# Patient Record
Sex: FEMALE | Race: WHITE | NOT HISPANIC OR LATINO | Employment: OTHER | URBAN - METROPOLITAN AREA
[De-identification: names, ages, dates, MRNs, and addresses within clinical notes are randomized per-mention and may not be internally consistent; named-entity substitution may affect disease eponyms.]

---

## 2017-01-18 ENCOUNTER — OFFICE VISIT (OUTPATIENT)
Dept: CARDIOLOGY | Facility: CLINIC | Age: 82
End: 2017-01-18
Payer: MEDICARE

## 2017-01-18 VITALS
WEIGHT: 133.81 LBS | SYSTOLIC BLOOD PRESSURE: 168 MMHG | HEIGHT: 58 IN | DIASTOLIC BLOOD PRESSURE: 66 MMHG | BODY MASS INDEX: 28.09 KG/M2 | HEART RATE: 66 BPM

## 2017-01-18 DIAGNOSIS — I44.7 LEFT BUNDLE BRANCH BLOCK: ICD-10-CM

## 2017-01-18 DIAGNOSIS — I25.5 ISCHEMIC CARDIOMYOPATHY: Primary | ICD-10-CM

## 2017-01-18 DIAGNOSIS — N18.9 CHRONIC RENAL INSUFFICIENCY, UNSPECIFIED STAGE: ICD-10-CM

## 2017-01-18 DIAGNOSIS — I10 ESSENTIAL HYPERTENSION: ICD-10-CM

## 2017-01-18 PROCEDURE — 99499 UNLISTED E&M SERVICE: CPT | Mod: S$GLB,,, | Performed by: INTERNAL MEDICINE

## 2017-01-18 PROCEDURE — 99999 PR PBB SHADOW E&M-EST. PATIENT-LVL III: CPT | Mod: PBBFAC,,, | Performed by: INTERNAL MEDICINE

## 2017-01-18 PROCEDURE — 1126F AMNT PAIN NOTED NONE PRSNT: CPT | Mod: S$GLB,,, | Performed by: INTERNAL MEDICINE

## 2017-01-18 PROCEDURE — 1157F ADVNC CARE PLAN IN RCRD: CPT | Mod: S$GLB,,, | Performed by: INTERNAL MEDICINE

## 2017-01-18 PROCEDURE — 1160F RVW MEDS BY RX/DR IN RCRD: CPT | Mod: S$GLB,,, | Performed by: INTERNAL MEDICINE

## 2017-01-18 PROCEDURE — 99214 OFFICE O/P EST MOD 30 MIN: CPT | Mod: S$GLB,,, | Performed by: INTERNAL MEDICINE

## 2017-01-18 PROCEDURE — 1159F MED LIST DOCD IN RCRD: CPT | Mod: S$GLB,,, | Performed by: INTERNAL MEDICINE

## 2017-01-18 RX ORDER — DIPHENOXYLATE HYDROCHLORIDE AND ATROPINE SULFATE 2.5; .025 MG/1; MG/1
1 TABLET ORAL 4 TIMES DAILY PRN
COMMUNITY
End: 2017-04-25

## 2017-01-18 NOTE — PROGRESS NOTES
Subjective:    Patient ID:  Kristen Nicole is a 89 y.o. female who presents for follow-up of Hospital Follow Up (established pt- Dr. Bray ) and Hyperlipidemia      HPI 89 yo with hx of CAD and decreased LV function. Last cath 6-2016 with patent LAD stent with non-obstructive disease.Stent placed March of 2016. Since recent discharge doing well. Mild ALBRECHT. No edema or CP. Denies palpitations, weak spells, and syncope      Review of Systems   Constitution: Negative for decreased appetite, fever, weakness, malaise/fatigue, weight gain and weight loss.   HENT: Negative for headaches, hearing loss and nosebleeds.    Eyes: Negative for visual disturbance.   Cardiovascular: Positive for dyspnea on exertion. Negative for chest pain, claudication, cyanosis, irregular heartbeat, leg swelling, near-syncope, orthopnea, palpitations, paroxysmal nocturnal dyspnea and syncope.   Respiratory: Positive for shortness of breath. Negative for cough, hemoptysis, sleep disturbances due to breathing, snoring and wheezing.    Endocrine: Negative for cold intolerance, heat intolerance, polydipsia and polyuria.   Hematologic/Lymphatic: Negative for adenopathy and bleeding problem. Bruises/bleeds easily.   Skin: Negative for color change, itching, poor wound healing, rash and suspicious lesions.   Musculoskeletal: Positive for arthritis. Negative for back pain, falls, joint pain, joint swelling, muscle cramps, muscle weakness and myalgias.   Gastrointestinal: Negative for bloating, abdominal pain, change in bowel habit, constipation, flatus, heartburn, hematemesis, hematochezia, hemorrhoids, jaundice, melena, nausea and vomiting.   Genitourinary: Negative for bladder incontinence, decreased libido, frequency, hematuria, hesitancy and urgency.   Neurological: Negative for brief paralysis, difficulty with concentration, excessive daytime sleepiness, dizziness, focal weakness, light-headedness, loss of balance, numbness and vertigo.  "  Psychiatric/Behavioral: Negative for altered mental status, depression and memory loss. The patient does not have insomnia and is not nervous/anxious.    Allergic/Immunologic: Negative for environmental allergies, hives and persistent infections.        Objective:    Physical Exam   Constitutional: She is oriented to person, place, and time. She appears well-developed and well-nourished.   BP (!) 168/66  Pulse 66  Ht 4' 10" (1.473 m)  Wt 60.7 kg (133 lb 13.1 oz)  LMP  (LMP Unknown)  BMI 27.97 kg/m2     HENT:   Head: Normocephalic and atraumatic.   Right Ear: External ear normal.   Left Ear: External ear normal.   Nose: Nose normal.   Mouth/Throat: Oropharynx is clear and moist.   Eyes: Conjunctivae, EOM and lids are normal. Pupils are equal, round, and reactive to light. Right eye exhibits no discharge. Left eye exhibits no discharge. Right conjunctiva has no hemorrhage. No scleral icterus.   Neck: Normal range of motion. Neck supple. No JVD present. No tracheal deviation present. No thyromegaly present.   Cardiovascular: Normal rate, regular rhythm, normal heart sounds and intact distal pulses.  Exam reveals no gallop and no friction rub.    No murmur heard.  Pulmonary/Chest: Effort normal and breath sounds normal. No respiratory distress. She has no wheezes. She has no rales. She exhibits no tenderness. Breasts are symmetrical.   Abdominal: Soft. Bowel sounds are normal. She exhibits no distension and no mass. There is no hepatosplenomegaly or hepatomegaly. There is no tenderness. There is no rebound and no guarding.   Musculoskeletal: Normal range of motion. She exhibits no edema or tenderness.   Lymphadenopathy:     She has no cervical adenopathy.   Neurological: She is alert and oriented to person, place, and time. She displays normal reflexes. No cranial nerve deficit. Coordination normal.   Skin: Skin is warm and dry. No rash noted. No erythema. No pallor.   Psychiatric: She has a normal mood and affect. " Her behavior is normal. Judgment and thought content normal.   Nursing note and vitals reviewed.        Assessment:       1. Ischemic cardiomyopathy    2. Essential hypertension    3. Left bundle branch block    4. Chronic renal insufficiency, unspecified stage         Plan:     Systolic BP elevated. States better at home and has home health nurse checking.   The current medical regimen is effective;  continue present plan and medications.   Recheck echo in 3 months  No orders of the defined types were placed in this encounter.  Recent lab reviewed and OK  Return in about 3 months (around 4/18/2017).

## 2017-02-06 RX ORDER — FUROSEMIDE 20 MG/1
20 TABLET ORAL DAILY
Qty: 90 TABLET | Refills: 3 | Status: ON HOLD | OUTPATIENT
Start: 2017-02-06 | End: 2017-09-29

## 2017-02-06 RX ORDER — ISOSORBIDE MONONITRATE 60 MG/1
60 TABLET, EXTENDED RELEASE ORAL NIGHTLY
Qty: 90 TABLET | Refills: 3 | Status: SHIPPED | OUTPATIENT
Start: 2017-02-06 | End: 2018-03-10 | Stop reason: SDUPTHER

## 2017-03-03 ENCOUNTER — TELEPHONE (OUTPATIENT)
Dept: CARDIOLOGY | Facility: CLINIC | Age: 82
End: 2017-03-03

## 2017-03-03 NOTE — TELEPHONE ENCOUNTER
----- Message from Rosalia Olea RT sent at 3/3/2017  9:59 AM CST -----  Contact: Destiny (Daughter) [phone]  Destiny (Daughter) [phone], requesting a call back soon to have the pt worked in for a f/u appt with Dr. Gloria in the month of April 2017, please call back on Monday 03/06/2017, thanks.

## 2017-04-11 ENCOUNTER — CLINICAL SUPPORT (OUTPATIENT)
Dept: CARDIOLOGY | Facility: CLINIC | Age: 82
End: 2017-04-11
Payer: MEDICARE

## 2017-04-11 DIAGNOSIS — N18.4 CHRONIC KIDNEY DISEASE (CKD) STAGE G4/A1, SEVERELY DECREASED GLOMERULAR FILTRATION RATE (GFR) BETWEEN 15-29 ML/MIN/1.73 SQUARE METER AND ALBUMINURIA CREATININE RATIO LESS THAN 30 MG/G: ICD-10-CM

## 2017-04-11 DIAGNOSIS — R55 SYNCOPE, UNSPECIFIED SYNCOPE TYPE: ICD-10-CM

## 2017-04-11 DIAGNOSIS — E78.5 HYPERLIPIDEMIA WITH TARGET LDL LESS THAN 70: ICD-10-CM

## 2017-04-11 DIAGNOSIS — I70.209 TYPE 2 DIABETES WITH ATHEROSCLEROSIS OF ARTERIES OF EXTREMITIES: ICD-10-CM

## 2017-04-11 DIAGNOSIS — I44.7 LEFT BUNDLE BRANCH BLOCK: ICD-10-CM

## 2017-04-11 DIAGNOSIS — E11.51 TYPE 2 DIABETES WITH ATHEROSCLEROSIS OF ARTERIES OF EXTREMITIES: ICD-10-CM

## 2017-04-11 DIAGNOSIS — I51.89 DIASTOLIC DYSFUNCTION: ICD-10-CM

## 2017-04-11 DIAGNOSIS — I11.9 HYPERTENSIVE LEFT VENTRICULAR HYPERTROPHY, WITHOUT HEART FAILURE: ICD-10-CM

## 2017-04-11 DIAGNOSIS — I10 ESSENTIAL HYPERTENSION: ICD-10-CM

## 2017-04-11 DIAGNOSIS — I25.10 CORONARY ARTERY DISEASE INVOLVING NATIVE CORONARY ARTERY WITHOUT ANGINA PECTORIS, UNSPECIFIED WHETHER NATIVE OR TRANSPLANTED HEART: ICD-10-CM

## 2017-04-11 LAB
DIASTOLIC DYSFUNCTION: YES
ESTIMATED PA SYSTOLIC PRESSURE: 62.29
MITRAL VALVE MOBILITY: NORMAL
MITRAL VALVE REGURGITATION: ABNORMAL
RETIRED EF AND QEF - SEE NOTES: 55 (ref 55–65)
TRICUSPID VALVE REGURGITATION: ABNORMAL

## 2017-04-11 PROCEDURE — 93306 TTE W/DOPPLER COMPLETE: CPT | Mod: S$GLB,,, | Performed by: INTERNAL MEDICINE

## 2017-04-25 ENCOUNTER — OFFICE VISIT (OUTPATIENT)
Dept: CARDIOLOGY | Facility: CLINIC | Age: 82
End: 2017-04-25
Payer: MEDICARE

## 2017-04-25 VITALS
DIASTOLIC BLOOD PRESSURE: 86 MMHG | HEART RATE: 66 BPM | WEIGHT: 136.88 LBS | HEIGHT: 58 IN | SYSTOLIC BLOOD PRESSURE: 200 MMHG | BODY MASS INDEX: 28.73 KG/M2

## 2017-04-25 DIAGNOSIS — I51.9 SYSTOLIC DYSFUNCTION: ICD-10-CM

## 2017-04-25 DIAGNOSIS — I10 ESSENTIAL HYPERTENSION: ICD-10-CM

## 2017-04-25 DIAGNOSIS — I34.0 MITRAL VALVE INSUFFICIENCY, UNSPECIFIED ETIOLOGY: ICD-10-CM

## 2017-04-25 DIAGNOSIS — I25.10 ATHEROSCLEROSIS OF NATIVE CORONARY ARTERY WITHOUT ANGINA PECTORIS, UNSPECIFIED WHETHER NATIVE OR TRANSPLANTED HEART: Primary | ICD-10-CM

## 2017-04-25 DIAGNOSIS — I44.7 LEFT BUNDLE BRANCH BLOCK: ICD-10-CM

## 2017-04-25 DIAGNOSIS — E11.22 TYPE 2 DIABETES MELLITUS WITH STAGE 4 CHRONIC KIDNEY DISEASE, WITHOUT LONG-TERM CURRENT USE OF INSULIN: ICD-10-CM

## 2017-04-25 DIAGNOSIS — N18.4 TYPE 2 DIABETES MELLITUS WITH STAGE 4 CHRONIC KIDNEY DISEASE, WITHOUT LONG-TERM CURRENT USE OF INSULIN: ICD-10-CM

## 2017-04-25 DIAGNOSIS — I25.5 ISCHEMIC CARDIOMYOPATHY: ICD-10-CM

## 2017-04-25 PROCEDURE — 99499 UNLISTED E&M SERVICE: CPT | Mod: S$GLB,,, | Performed by: INTERNAL MEDICINE

## 2017-04-25 PROCEDURE — 1125F AMNT PAIN NOTED PAIN PRSNT: CPT | Mod: S$GLB,,, | Performed by: INTERNAL MEDICINE

## 2017-04-25 PROCEDURE — 99999 PR PBB SHADOW E&M-EST. PATIENT-LVL II: CPT | Mod: PBBFAC,,, | Performed by: INTERNAL MEDICINE

## 2017-04-25 PROCEDURE — 1159F MED LIST DOCD IN RCRD: CPT | Mod: S$GLB,,, | Performed by: INTERNAL MEDICINE

## 2017-04-25 PROCEDURE — 99214 OFFICE O/P EST MOD 30 MIN: CPT | Mod: S$GLB,,, | Performed by: INTERNAL MEDICINE

## 2017-04-25 PROCEDURE — 1157F ADVNC CARE PLAN IN RCRD: CPT | Mod: S$GLB,,, | Performed by: INTERNAL MEDICINE

## 2017-04-25 PROCEDURE — 1160F RVW MEDS BY RX/DR IN RCRD: CPT | Mod: S$GLB,,, | Performed by: INTERNAL MEDICINE

## 2017-04-25 NOTE — PROGRESS NOTES
Subjective:    Patient ID:  Kristen Nicole is a 89 y.o. female who presents for follow-up of Mitral Regurgitation and CAD    HPI  She comes with no complaints, no chest pain, no shortness of breath  FC II    Review of Systems   Constitution: Negative for decreased appetite, weakness, malaise/fatigue, weight gain and weight loss.   Cardiovascular: Negative for chest pain, dyspnea on exertion, leg swelling, palpitations and syncope.   Respiratory: Negative for cough and shortness of breath.    Gastrointestinal: Negative.    All other systems reviewed and are negative.       Objective:    Physical Exam   Constitutional: She is oriented to person, place, and time. She appears well-developed and well-nourished.   HENT:   Head: Normocephalic.   Eyes: Pupils are equal, round, and reactive to light.   Neck: Normal range of motion. Neck supple. No JVD present. Carotid bruit is not present. No thyromegaly present.   Cardiovascular: Normal rate, regular rhythm, intact distal pulses and normal pulses.  PMI is not displaced.  Exam reveals no gallop.    Murmur heard.  High-pitched blowing holosystolic murmur is present with a grade of 3/6  at the apex  Pulmonary/Chest: Effort normal and breath sounds normal.   Abdominal: Soft. Normal appearance. She exhibits no mass. There is no hepatosplenomegaly. There is no tenderness.   Musculoskeletal: Normal range of motion. She exhibits no edema.   Neurological: She is alert and oriented to person, place, and time. She has normal strength and normal reflexes. No sensory deficit.   Skin: Skin is warm and intact.   Psychiatric: She has a normal mood and affect.   Nursing note and vitals reviewed.    Echo reviewed      Assessment:       1. Atherosclerosis of native coronary artery without angina pectoris, unspecified whether native or transplanted heart    2. Ischemic cardiomyopathy    3. Essential hypertension    4. Left bundle branch block    5. Mitral valve insufficiency, unspecified  etiology    6. Type 2 diabetes mellitus with stage 4 chronic kidney disease, without long-term current use of insulin    7. Systolic dysfunction, LVEF 41%         Plan:     Continue all cardiac medications  Regular exercise program  6 m f/u

## 2017-05-01 ENCOUNTER — TELEPHONE (OUTPATIENT)
Dept: CARDIOLOGY | Facility: CLINIC | Age: 82
End: 2017-05-01

## 2017-05-09 ENCOUNTER — DOCUMENTATION ONLY (OUTPATIENT)
Dept: ADMINISTRATIVE | Facility: HOSPITAL | Age: 82
End: 2017-05-09

## 2017-05-09 NOTE — PROGRESS NOTES
PRE-VISIT CHART REVIEW    Appointment Scheduled on 5/10/17    Department stratifications & guidelines reviewed:yes    Target Chronic Diagnosis: DM, HTN, obesity    Chronic Diagnosis Intervention Due: yes    Goals Updated:No    Health Maintenance Due   Topic Date Due    Zoster Vaccine  12/04/1983    Pneumococcal (65+) (2 of 2 - PCV13) 01/06/2010    Urine Microalbumin  09/08/2016    Eye Exam  04/05/2017    Foot Exam  04/25/2017       Advanced Directives:   65 years of age or older?  Yes  Directive on file?  yes                                      Pre-visit patient communication:  In Person    Studies or screenings scheduled pre-visit: no    Educate patient on HTN (200/86), obesity (28.61)

## 2017-05-10 ENCOUNTER — OFFICE VISIT (OUTPATIENT)
Dept: FAMILY MEDICINE | Facility: CLINIC | Age: 82
End: 2017-05-10
Payer: MEDICARE

## 2017-05-10 VITALS
SYSTOLIC BLOOD PRESSURE: 138 MMHG | HEIGHT: 58 IN | RESPIRATION RATE: 18 BRPM | BODY MASS INDEX: 28.51 KG/M2 | WEIGHT: 135.81 LBS | TEMPERATURE: 98 F | HEART RATE: 88 BPM | DIASTOLIC BLOOD PRESSURE: 88 MMHG

## 2017-05-10 DIAGNOSIS — E11.69 HYPERLIPIDEMIA ASSOCIATED WITH TYPE 2 DIABETES MELLITUS: ICD-10-CM

## 2017-05-10 DIAGNOSIS — N18.30 CHRONIC KIDNEY DISEASE, STAGE III (MODERATE): ICD-10-CM

## 2017-05-10 DIAGNOSIS — E78.5 HYPERLIPIDEMIA ASSOCIATED WITH TYPE 2 DIABETES MELLITUS: ICD-10-CM

## 2017-05-10 DIAGNOSIS — M81.0 OSTEOPOROSIS, UNSPECIFIED OSTEOPOROSIS TYPE, UNSPECIFIED PATHOLOGICAL FRACTURE PRESENCE: ICD-10-CM

## 2017-05-10 DIAGNOSIS — E11.8 CONTROLLED TYPE 2 DIABETES MELLITUS WITH COMPLICATION, WITHOUT LONG-TERM CURRENT USE OF INSULIN: Primary | ICD-10-CM

## 2017-05-10 DIAGNOSIS — I25.110 CORONARY ARTERY DISEASE INVOLVING NATIVE CORONARY ARTERY OF NATIVE HEART WITH UNSTABLE ANGINA PECTORIS: ICD-10-CM

## 2017-05-10 DIAGNOSIS — E79.0 HYPERURICEMIA: ICD-10-CM

## 2017-05-10 DIAGNOSIS — E11.59 HYPERTENSION ASSOCIATED WITH DIABETES: ICD-10-CM

## 2017-05-10 DIAGNOSIS — R07.9 CHEST PAIN AT REST: ICD-10-CM

## 2017-05-10 DIAGNOSIS — I15.2 HYPERTENSION ASSOCIATED WITH DIABETES: ICD-10-CM

## 2017-05-10 LAB
25(OH)D3+25(OH)D2 SERPL-MCNC: 56 NG/ML
ALBUMIN SERPL BCP-MCNC: 4.5 G/DL
ALP SERPL-CCNC: 72 U/L
ALT SERPL W/O P-5'-P-CCNC: 16 U/L
ANION GAP SERPL CALC-SCNC: 12 MMOL/L
AST SERPL-CCNC: 23 U/L
BASOPHILS # BLD AUTO: 0.03 K/UL
BASOPHILS NFR BLD: 0.4 %
BILIRUB SERPL-MCNC: 0.4 MG/DL
BUN SERPL-MCNC: 31 MG/DL
CALCIUM SERPL-MCNC: 9.5 MG/DL
CHLORIDE SERPL-SCNC: 104 MMOL/L
CHOLEST/HDLC SERPL: 2.7 {RATIO}
CO2 SERPL-SCNC: 24 MMOL/L
CREAT SERPL-MCNC: 1.6 MG/DL
CREAT UR-MCNC: 45 MG/DL
CREAT UR-MCNC: 45 MG/DL
DIFFERENTIAL METHOD: NORMAL
EOSINOPHIL # BLD AUTO: 0.2 K/UL
EOSINOPHIL NFR BLD: 3.2 %
ERYTHROCYTE [DISTWIDTH] IN BLOOD BY AUTOMATED COUNT: 13.6 %
EST. GFR  (AFRICAN AMERICAN): 31.8 ML/MIN/1.73 M^2
EST. GFR  (NON AFRICAN AMERICAN): 27.6 ML/MIN/1.73 M^2
GLUCOSE SERPL-MCNC: 108 MG/DL
HCT VFR BLD AUTO: 38.3 %
HDL/CHOLESTEROL RATIO: 36.7 %
HDLC SERPL-MCNC: 188 MG/DL
HDLC SERPL-MCNC: 69 MG/DL
HGB BLD-MCNC: 12.3 G/DL
LDLC SERPL CALC-MCNC: 95.6 MG/DL
LYMPHOCYTES # BLD AUTO: 2.4 K/UL
LYMPHOCYTES NFR BLD: 34.9 %
MCH RBC QN AUTO: 30.4 PG
MCHC RBC AUTO-ENTMCNC: 32.1 %
MCV RBC AUTO: 95 FL
MICROALBUMIN UR DL<=1MG/L-MCNC: 135 UG/ML
MICROALBUMIN/CREATININE RATIO: 300 UG/MG
MONOCYTES # BLD AUTO: 0.5 K/UL
MONOCYTES NFR BLD: 7.7 %
NEUTROPHILS # BLD AUTO: 3.7 K/UL
NEUTROPHILS NFR BLD: 53.7 %
NONHDLC SERPL-MCNC: 119 MG/DL
PHOSPHATE SERPL-MCNC: 3.6 MG/DL
PLATELET # BLD AUTO: 250 K/UL
PMV BLD AUTO: 10.9 FL
POTASSIUM SERPL-SCNC: 4.4 MMOL/L
PROT SERPL-MCNC: 7.9 G/DL
PROT UR-MCNC: 26 MG/DL
PROT/CREAT RATIO, UR: 0.58
PTH-INTACT SERPL-MCNC: 262 PG/ML
RBC # BLD AUTO: 4.04 M/UL
SODIUM SERPL-SCNC: 140 MMOL/L
TRIGL SERPL-MCNC: 117 MG/DL
TSH SERPL DL<=0.005 MIU/L-ACNC: 1.56 UIU/ML
WBC # BLD AUTO: 6.85 K/UL

## 2017-05-10 PROCEDURE — 1157F ADVNC CARE PLAN IN RCRD: CPT | Mod: S$GLB,,, | Performed by: FAMILY MEDICINE

## 2017-05-10 PROCEDURE — 1159F MED LIST DOCD IN RCRD: CPT | Mod: S$GLB,,, | Performed by: FAMILY MEDICINE

## 2017-05-10 PROCEDURE — 80053 COMPREHEN METABOLIC PANEL: CPT

## 2017-05-10 PROCEDURE — 90670 PCV13 VACCINE IM: CPT | Mod: S$GLB,,, | Performed by: FAMILY MEDICINE

## 2017-05-10 PROCEDURE — 36415 COLL VENOUS BLD VENIPUNCTURE: CPT | Mod: S$GLB,,, | Performed by: FAMILY MEDICINE

## 2017-05-10 PROCEDURE — 82306 VITAMIN D 25 HYDROXY: CPT

## 2017-05-10 PROCEDURE — 84443 ASSAY THYROID STIM HORMONE: CPT

## 2017-05-10 PROCEDURE — 82570 ASSAY OF URINE CREATININE: CPT

## 2017-05-10 PROCEDURE — 99499 UNLISTED E&M SERVICE: CPT | Mod: S$GLB,,, | Performed by: FAMILY MEDICINE

## 2017-05-10 PROCEDURE — G0009 ADMIN PNEUMOCOCCAL VACCINE: HCPCS | Mod: 59,S$GLB,, | Performed by: FAMILY MEDICINE

## 2017-05-10 PROCEDURE — 84156 ASSAY OF PROTEIN URINE: CPT

## 2017-05-10 PROCEDURE — 1126F AMNT PAIN NOTED NONE PRSNT: CPT | Mod: S$GLB,,, | Performed by: FAMILY MEDICINE

## 2017-05-10 PROCEDURE — 85025 COMPLETE CBC W/AUTO DIFF WBC: CPT

## 2017-05-10 PROCEDURE — 84100 ASSAY OF PHOSPHORUS: CPT

## 2017-05-10 PROCEDURE — 83970 ASSAY OF PARATHORMONE: CPT

## 2017-05-10 PROCEDURE — 83036 HEMOGLOBIN GLYCOSYLATED A1C: CPT

## 2017-05-10 PROCEDURE — 99215 OFFICE O/P EST HI 40 MIN: CPT | Mod: S$GLB,,, | Performed by: FAMILY MEDICINE

## 2017-05-10 PROCEDURE — 80061 LIPID PANEL: CPT

## 2017-05-10 PROCEDURE — 1160F RVW MEDS BY RX/DR IN RCRD: CPT | Mod: S$GLB,,, | Performed by: FAMILY MEDICINE

## 2017-05-10 RX ORDER — NITROGLYCERIN 0.4 MG/1
0.4 TABLET SUBLINGUAL EVERY 5 MIN PRN
Qty: 25 TABLET | Refills: 3 | Status: SHIPPED | OUTPATIENT
Start: 2017-05-10 | End: 2018-01-01 | Stop reason: SDUPTHER

## 2017-05-10 RX ORDER — OMEGA-3-ACID ETHYL ESTERS 1 G/1
1 CAPSULE, LIQUID FILLED ORAL EVERY OTHER DAY
COMMUNITY
End: 2018-01-01

## 2017-05-10 RX ORDER — MULTIVITAMIN
1 TABLET ORAL DAILY
COMMUNITY
End: 2018-01-01

## 2017-05-10 NOTE — MR AVS SNAPSHOT
[facility]  [address] [address] [address]  [facility] [address]  Phone: [phone]  Fax: [phone]                  Kristen Nicole   5/10/2017 9:10 AM   Office Visit    Description:  Female : 1923   Provider:  Codi Jung MD   Department:  [facility]           Reason for Visit     Establish Care           Diagnoses this Visit        Comments    Controlled type 2 diabetes mellitus with complication, without long-term current use of insulin    -  Primary     Coronary artery disease involving native coronary artery of native heart with unstable angina pectoris         Chest pain at rest         Hyperuricemia         Hyperlipidemia associated with type 2 diabetes mellitus         Hypertension associated with diabetes         Osteoporosis, unspecified osteoporosis type, unspecified pathological fracture presence         Chronic kidney disease, stage III (moderate)                To Do List           Future Appointments        Provider Department Dept Phone    2017 9:15 AM SHAHZAD Watson OD [place] [facility] [phone]    2017 10:40 AM [facility] DEXA1 [facility] [phone]      Goals (5 Years of Data)     None       These Medications        Disp Refills Start End    nitroGLYCERIN (NITROSTAT) 0.4 MG SL tablet 25 tablet 3 5/10/2017     Place 1 tablet (0.4 mg total) under the tongue every 5 (five) minutes as needed for Chest pain. - Sublingual    Pharmacy: [facility] - [place] [phone] [facility] Ph #: [phone]         [facility]     [facility] Nurse Care Line -  Assistance  Unless otherwise directed by your provider, please contact [facility], our nurse care line that is available for  assistance.     Registered nurses in the [facility] provide: appointment scheduling, clinical advisement, health education, and other advisory services.  Call:  [phone] (toll free)               Medications           Message regarding Medications     Verify the changes and/or additions to your medication regime listed below are the same as discussed with your clinician today.  If any of these changes or additions are incorrect, please notify your healthcare provider.        STOP taking these medications     cholecalciferol, vitamin D3, (VITAMIN D3) 1,000 unit capsule Take 1,000 Units by mouth once daily.    IRON/VITAMIN B COMPLEX (GERITOL ORAL) Take 2 tablets by mouth.           Verify that the below list of medications is an accurate representation of the medications you are currently taking.  If none reported, the list may be blank. If incorrect, please contact your healthcare provider. Carry this list with you in case of emergency.           Current Medications     alprazolam (XANAX) 0.25 MG tablet Take 1 tablet (0.25 mg total) by mouth 2 (two) times daily as needed for Anxiety.    aspirin (ECOTRIN) 81 MG EC tablet Take 81 mg by mouth once daily.    blood sugar diagnostic Strp 1 strip by Misc.(Non-Drug; Combo Route) route once daily. Accu-check Plus Test Strips Drum    carbamazepine (TEGRETOL XR) 100 MG 12 hr tablet TAKE 1 TABLET TWICE DAILY    clopidogrel (PLAVIX) 75 mg tablet TAKE 1 TABLET ONE TIME DAILY    cranberry 400 mg Cap Take 500 mg by mouth once daily.     felodipine (PLENDIL) 5 MG 24 hr tablet Take 1 tablet (5 mg total) by mouth every evening.    ferrous sulfate 325 mg (65 mg iron) Tab tablet Take 1 tablet (325 mg total) by mouth once daily. Try on empty stomach first, can take with food if GI upset.    fluticasone (FLONASE) 50 mcg/actuation nasal spray 2 sprays by Each Nare route once daily.    furosemide (LASIX) 20 MG tablet Take 1 tablet (20 mg total) by mouth once daily.    GINGER ROOT, BULK, MISC 550 mg by Misc.(Non-Drug; Combo Route) route once daily.    hydrALAZINE (APRESOLINE) 25 MG tablet Take 1 tablet (25 mg total) by mouth every 12 (twelve)  "hours.    ipratropium (ATROVENT HFA) 17 mcg/actuation inhaler Inhale 2 puffs into the lungs every 6 (six) hours.    isosorbide mononitrate (IMDUR) 60 MG 24 hr tablet Take 1 tablet (60 mg total) by mouth every evening.    labetalol (NORMODYNE) 200 MG tablet Take 1 tablet (200 mg total) by mouth every 12 (twelve) hours.    multivitamin (ONE DAILY MULTIVITAMIN) per tablet Take 1 tablet by mouth once daily.    nitroGLYCERIN (NITROSTAT) 0.4 MG SL tablet Place 1 tablet (0.4 mg total) under the tongue every 5 (five) minutes as needed for Chest pain.    omega-3 acid ethyl esters (LOVAZA) 1 gram capsule Take 1 g by mouth 2 (two) times daily.    omeprazole (PRILOSEC) 20 MG capsule Take 1 capsule (20 mg total) by mouth once daily.    simvastatin (ZOCOR) 20 MG tablet Take 1 tablet (20 mg total) by mouth every evening.           Clinical Reference Information           Your Vitals Were     BP Pulse Temp Resp    138/88 (BP Location: Right arm, Patient Position: Sitting, BP Method: Manual) 88 97.9 °F (36.6 °C) (Oral) 18    Height Weight Last Period BMI    4' 10" (1.473 m) 61.6 kg (135 lb 12.9 oz) (LMP Unknown) 28.38 kg/m2      Blood Pressure          Most Recent Value    BP  138/88      Allergies as of 5/10/2017     Albuterol    Influenza A (H1n1) Vac 09 (Pf)    Nortriptyline    Pneumococcal Vaccine    Prevnar 13 [Pneumoc 13-abram Conj-dip Cr(pf)]      Immunizations Administered on Date of Encounter - 5/10/2017     Name Date Dose VIS Date Route    Pneumococcal Conjugate - 13 Valent 5/10/2017 0.5 mL 11/5/2015 Intramuscular      Orders Placed During Today's Visit      Normal Orders This Visit    Ambulatory referral to Optometry     CBC auto differential     Comprehensive metabolic panel     Hemoglobin A1c     Lipid panel     Microalbumin/creatinine urine ratio     Pneumococcal Conjugate Vaccine (13 Valent) (IM)     Protein / creatinine ratio, urine     PTH, intact     TSH     Vitamin D     Future Labs/Procedures Expected by Expires    " DXA Bone Density Spine And Hip  5/10/2017 5/10/2018    PHOSPHORUS  5/10/2017 7/9/2018      Language Assistance Services     ATTENTION: Language assistance services are available, free of charge. Please call [phone].      ATENCIÓN: Si habla latosha, tiene a haywood disposición servicios gratuitos de asistencia lingüística. Llame al [phone].     CHÚ Ý: N?u b?n nói Ti?ng Vi?t, có các d?ch v? h? tr? ngôn ng? mi?n phí dành cho b?n. G?i s? [phone].         [facility] complies with applicable Federal civil rights laws and does not discriminate on the basis of race, color, national origin, age, disability, or sex.

## 2017-05-11 LAB
ESTIMATED AVG GLUCOSE: 154 MG/DL
HBA1C MFR BLD HPLC: 7 %

## 2017-05-12 ENCOUNTER — TELEPHONE (OUTPATIENT)
Dept: FAMILY MEDICINE | Facility: CLINIC | Age: 82
End: 2017-05-12

## 2017-05-12 DIAGNOSIS — N18.4 CKD (CHRONIC KIDNEY DISEASE) STAGE 4, GFR 15-29 ML/MIN: Primary | ICD-10-CM

## 2017-05-12 NOTE — TELEPHONE ENCOUNTER
Spoke to patients daughter regarding results, verbalized understanding. Appts scheduled, daughter aware.

## 2017-05-12 NOTE — TELEPHONE ENCOUNTER
Pls notify pt:    Overall, labs look good except for slight incease in diabetes marker, decreased kidney function and protein in the urine    Let's merle you with the nephrologist and let's discuss all this at your next visit    (Merle fu with me in 4 weeks)

## 2017-05-15 ENCOUNTER — TELEPHONE (OUTPATIENT)
Dept: FAMILY MEDICINE | Facility: CLINIC | Age: 82
End: 2017-05-15

## 2017-05-15 ENCOUNTER — OFFICE VISIT (OUTPATIENT)
Dept: NEPHROLOGY | Facility: CLINIC | Age: 82
End: 2017-05-15
Payer: MEDICARE

## 2017-05-15 VITALS
OXYGEN SATURATION: 98 % | DIASTOLIC BLOOD PRESSURE: 62 MMHG | HEIGHT: 58 IN | WEIGHT: 137.38 LBS | SYSTOLIC BLOOD PRESSURE: 170 MMHG | TEMPERATURE: 98 F | BODY MASS INDEX: 28.84 KG/M2 | HEART RATE: 65 BPM

## 2017-05-15 DIAGNOSIS — N17.9 ACUTE RENAL FAILURE, UNSPECIFIED ACUTE RENAL FAILURE TYPE: ICD-10-CM

## 2017-05-15 DIAGNOSIS — N18.30 CHRONIC KIDNEY DISEASE, STAGE III (MODERATE): Primary | ICD-10-CM

## 2017-05-15 PROCEDURE — 1157F ADVNC CARE PLAN IN RCRD: CPT | Mod: S$GLB,,, | Performed by: INTERNAL MEDICINE

## 2017-05-15 PROCEDURE — 1159F MED LIST DOCD IN RCRD: CPT | Mod: S$GLB,,, | Performed by: INTERNAL MEDICINE

## 2017-05-15 PROCEDURE — 1126F AMNT PAIN NOTED NONE PRSNT: CPT | Mod: S$GLB,,, | Performed by: INTERNAL MEDICINE

## 2017-05-15 PROCEDURE — 99999 PR PBB SHADOW E&M-EST. PATIENT-LVL III: CPT | Mod: PBBFAC,,, | Performed by: INTERNAL MEDICINE

## 2017-05-15 PROCEDURE — 99204 OFFICE O/P NEW MOD 45 MIN: CPT | Mod: S$GLB,,, | Performed by: INTERNAL MEDICINE

## 2017-05-15 PROCEDURE — 1160F RVW MEDS BY RX/DR IN RCRD: CPT | Mod: S$GLB,,, | Performed by: INTERNAL MEDICINE

## 2017-05-15 PROCEDURE — 99499 UNLISTED E&M SERVICE: CPT | Mod: S$GLB,,, | Performed by: INTERNAL MEDICINE

## 2017-05-15 NOTE — TELEPHONE ENCOUNTER
----- Message from Harry Robles MD sent at 5/15/2017  9:18 AM CDT -----  Hello.  I will repeat her labs on the 8th.  I asked her to drink a bit more water since she seems to be bad about that.  Any efforts to simplify her medication regimen would be great.

## 2017-05-15 NOTE — Clinical Note
Hello.  I will repeat her labs on the 8th.  I asked her to drink a bit more water since she seems to be bad about that.  Any efforts to simplify her medication regimen would be great.

## 2017-05-15 NOTE — PROGRESS NOTES
Subjective:       Patient ID: Kristen Nicole is a 89 y.o. White female who presents for new patient evaluation for chronic renal failure.    Kristen Nicole is referred by Codi Jung MD to be evaluated for chronic renal failure.  She was admitted in January with influenza.  She has been told that she has had kidney problems in the past.  She reports that she does not drink much water at times and says that plain water nauseates her.  She occasionally takes ibuprofen for the arthritis in her hands and back.  She did suffer a fall at home this weekend injuring her left elbow.  She would like to take fewer medications.      Review of Systems   Constitutional: Negative for appetite change, chills and fever.   Eyes: Negative for visual disturbance.   Respiratory: Positive for shortness of breath (intermittent). Negative for cough.    Cardiovascular: Negative for chest pain and leg swelling.   Gastrointestinal: Negative for diarrhea, nausea and vomiting.   Genitourinary: Negative for difficulty urinating, dysuria and hematuria.   Musculoskeletal: Positive for arthralgias (hands) and back pain.   Skin: Negative for rash.   Neurological: Negative for headaches.   Psychiatric/Behavioral: The patient is nervous/anxious.          Past Medical History:   Diagnosis Date    Arthritis     Asthma     CAD (coronary artery disease)     s/p LAD stent;  last cath 6/16 with 30-40% blockages    Carotid arterial disease     noted on USG 12/15    CKD (chronic kidney disease) stage 3, GFR 30-59 ml/min     Diabetes mellitus, type 2     Diastolic dysfunction     grade 2 on 4/17 ECHO    GERD (gastroesophageal reflux disease)     H/O colonoscopy     5/08    Hiatal hernia     noted on EGD    History of gout     History of SCC (squamous cell carcinoma) of skin     Hyperlipidemia     Hypertension     Ischemic cardiomyopathy     EF 55% on 4/17 ECHO but was 25% on 12/16 ECHO    Mitral valve regurgitation      mild-moderate on 4/17 ECHO    Occipital neuralgia     Osteoporosis     noted on 11/14    Pulmonary hypertension     noted on 4/17 ECHO     Past Surgical History:   Procedure Laterality Date    ANTERIOR CERVICAL DISCECTOMY W/ FUSION      Cervical Fusion    APPENDECTOMY      CATARACT EXTRACTION Bilateral     ORIF TIBIA & FIBULA FRACTURES      screws and wires in ankle    RETINAL DETACHMENT SURGERY Right     TONSILLECTOMY, ADENOIDECTOMY       Social History     Social History    Marital status:      Spouse name: N/A    Number of children: 5    Years of education: N/A     Occupational History    nurse       Office     Social History Main Topics    Smoking status: Never Smoker    Smokeless tobacco: Never Used    Alcohol use No    Drug use: No    Sexual activity: Not Currently     Other Topics Concern    Not on file     Social History Narrative     Current Outpatient Prescriptions   Medication Sig    alprazolam (XANAX) 0.25 MG tablet Take 1 tablet (0.25 mg total) by mouth 2 (two) times daily as needed for Anxiety.    aspirin (ECOTRIN) 81 MG EC tablet Take 81 mg by mouth once daily.    blood sugar diagnostic Strp 1 strip by Misc.(Non-Drug; Combo Route) route once daily. Accu-check Plus Test Strips Drum    carbamazepine (TEGRETOL XR) 100 MG 12 hr tablet TAKE 1 TABLET TWICE DAILY    clopidogrel (PLAVIX) 75 mg tablet TAKE 1 TABLET ONE TIME DAILY    cranberry 400 mg Cap Take 500 mg by mouth once daily.     felodipine (PLENDIL) 5 MG 24 hr tablet Take 1 tablet (5 mg total) by mouth every evening.    ferrous sulfate 325 mg (65 mg iron) Tab tablet Take 1 tablet (325 mg total) by mouth once daily. Try on empty stomach first, can take with food if GI upset.    fluticasone (FLONASE) 50 mcg/actuation nasal spray 2 sprays by Each Nare route once daily. (Patient taking differently: 2 sprays by Each Nare route daily as needed. )    furosemide (LASIX) 20 MG tablet Take 1 tablet (20 mg total) by mouth  "once daily.    GINGER ROOT, BULK, MISC 550 mg by Misc.(Non-Drug; Combo Route) route once daily.    hydrALAZINE (APRESOLINE) 25 MG tablet Take 1 tablet (25 mg total) by mouth every 12 (twelve) hours.    ipratropium (ATROVENT HFA) 17 mcg/actuation inhaler Inhale 2 puffs into the lungs every 6 (six) hours.    isosorbide mononitrate (IMDUR) 60 MG 24 hr tablet Take 1 tablet (60 mg total) by mouth every evening.    labetalol (NORMODYNE) 200 MG tablet Take 1 tablet (200 mg total) by mouth every 12 (twelve) hours.    multivitamin (ONE DAILY MULTIVITAMIN) per tablet Take 1 tablet by mouth once daily.    nitroGLYCERIN (NITROSTAT) 0.4 MG SL tablet Place 1 tablet (0.4 mg total) under the tongue every 5 (five) minutes as needed for Chest pain.    omega-3 acid ethyl esters (LOVAZA) 1 gram capsule Take 1 g by mouth 2 (two) times daily.    omeprazole (PRILOSEC) 20 MG capsule Take 1 capsule (20 mg total) by mouth once daily.    simvastatin (ZOCOR) 20 MG tablet Take 1 tablet (20 mg total) by mouth every evening.     No current facility-administered medications for this visit.        BP (!) 170/62 (BP Location: Left arm, Patient Position: Sitting)  Pulse 65  Temp 97.7 °F (36.5 °C)  Ht 4' 10" (1.473 m)  Wt 62.3 kg (137 lb 5.6 oz)  LMP  (LMP Unknown)  SpO2 98%  BMI 28.71 kg/m2    Objective:      Physical Exam   Constitutional: She is oriented to person, place, and time. She appears well-developed and well-nourished. No distress.   HENT:   Head: Normocephalic and atraumatic.   Eyes: Conjunctivae are normal.   Neck: Neck supple. No JVD present.   Cardiovascular: Normal rate, regular rhythm and normal heart sounds.  Exam reveals no gallop and no friction rub.    No murmur heard.  Pulmonary/Chest: Effort normal and breath sounds normal. No respiratory distress. She has no wheezes. She has no rales.   Abdominal: Soft. Bowel sounds are normal. She exhibits no distension. There is no tenderness.   Musculoskeletal: She exhibits " no edema.   Decreased pulses in hands and feet   Neurological: She is alert and oriented to person, place, and time.   Skin: Skin is warm and dry. No rash noted.   Psychiatric: She has a normal mood and affect.   Vitals reviewed.      Assessment:       1. Chronic kidney disease, stage III (moderate)    2. Acute renal failure, unspecified acute renal failure type        Plan:   Return to clinic in 6 months.  Labs for next visit include rp , pth.  Renal panel on 6/8.  Baseline creatinine is 1.3 from 7947-8571.  Since that time her creatinine has ranged from 1.4-1.7.  She has a renal US which showed small kidneys bilaterally at 8.0 and 8.3 cm.  I reassured her and asked her to drink more fluids as dehydration can cause her creatinine to be labile like it has been the past two years.  I do not see any particularly nephrotoxic medications but I would like to see her regimen simplified if possible.  She has had a somewhat labile function over the past 3 years with spikes but returns to her baseline.

## 2017-05-15 NOTE — PROGRESS NOTES
"Subjective:       Patient ID: Kristen Nicole is a 89 y.o. female.    Chief Complaint: Establish Care    HPI   The patient is a 89-year-old who is here today to establish care with me.  She recently moved to this area to live with her daughter.  Overall, she is doing "great".  She "lives an active life".  She likes to "go off with my friends" to do activities.    Today we discussed the followin)  diabetes.  She has diabetes but does not check her sugars at home.  She is currently on no medication for her diabetes.  She is due for diabetic eye exam  2)  anemia.  She has had trouble with anemia.  She is taking iron every other day because iron every day causes constipation  3)  CAD.  She does have a history of CAD.  She does have one stent.  She does see a cardiologist regularly.  She does need a refill of her nitroglycerin which she rarely uses.  Her current supply of nitroglycerin is  and she would like to have a new prescription.  She is taking aspirin, Plavix and imdur  4)  chronic kidney disease.  We did discuss her recent GFRs.  She is not currently seeing a nephrologist but would be willing to do so  5)  hyperlipidemia.  She is currently taking Zocor and lovaza.  She is due for labs and would be willing to have that done today  6)  hypertension.  Her blood pressure is good today.  She does not check her blood pressure outside of the office.  She is currently taking Plendil, hydralazine, and labetalol  7)  history of occipital neuralgias.  She is currently taking Tegretol due to the pain she had been getting in her occipital area.  She has been told never to stop this medication and plans to continue with this long-term   8)  history of gout.  She has had gouty episodes in the past but none recently.  9)  osteoporosis.  Her last DEXA scan was in 2014.  It did show osteoporosis but she did not have any treatment    Review of Systems   Constitutional: Negative for appetite change, chills, " diaphoresis, fatigue, fever and unexpected weight change.   HENT: Negative for congestion, ear pain, postnasal drip, rhinorrhea, sinus pressure, sneezing, sore throat and trouble swallowing.    Eyes: Negative for pain, discharge and visual disturbance.   Respiratory: Negative for cough, chest tightness, shortness of breath and wheezing.    Cardiovascular: Negative for chest pain, palpitations and leg swelling.   Gastrointestinal: Negative for abdominal distention, abdominal pain, blood in stool, constipation, diarrhea, nausea and vomiting.   Skin: Negative for rash.       Objective:      Physical Exam   Constitutional: She is oriented to person, place, and time. She appears well-developed and well-nourished. No distress.   HENT:   Head: Normocephalic and atraumatic.   Right Ear: Hearing, tympanic membrane, external ear and ear canal normal.   Left Ear: Hearing, tympanic membrane, external ear and ear canal normal.   Nose: Nose normal.   Mouth/Throat: Oropharynx is clear and moist and mucous membranes are normal. No oral lesions. No oropharyngeal exudate, posterior oropharyngeal edema or posterior oropharyngeal erythema.   Eyes: Conjunctivae, EOM and lids are normal. Pupils are equal, round, and reactive to light. No scleral icterus.   Neck: Normal range of motion. Neck supple. Carotid bruit is not present. No thyroid mass and no thyromegaly present.   Cardiovascular: Normal rate and regular rhythm.   No extrasystoles are present. PMI is not displaced.  Exam reveals no gallop.    Murmur heard.   Systolic murmur is present with a grade of 2/6   Pulses:       Dorsalis pedis pulses are 2+ on the right side, and 2+ on the left side.        Posterior tibial pulses are 2+ on the right side, and 2+ on the left side.   Pulmonary/Chest: Effort normal and breath sounds normal. No accessory muscle usage. No respiratory distress.   Clear to auscultation bilaterally.   Abdominal: Soft. Normal appearance and bowel sounds are  "normal. She exhibits no abdominal bruit. There is no hepatosplenomegaly. There is no tenderness. There is no rebound.   Musculoskeletal:        Right foot: There is no deformity.        Left foot: There is no deformity.   Feet:   Right Foot:   Protective Sensation: 10 sites tested. 10 sites sensed.   Skin Integrity: Positive for warmth. Negative for ulcer or callus.   Left Foot:   Protective Sensation: 10 sites tested. 10 sites sensed.   Skin Integrity: Positive for warmth. Negative for ulcer or callus.   Lymphadenopathy:        Head (right side): No submental and no submandibular adenopathy present.        Head (left side): No submental and no submandibular adenopathy present.        Right cervical: No superficial cervical, no deep cervical and no posterior cervical adenopathy present.       Left cervical: No superficial cervical, no deep cervical and no posterior cervical adenopathy present.        Right: No supraclavicular adenopathy present.        Left: No supraclavicular adenopathy present.   Neurological: She is alert and oriented to person, place, and time.   Skin: Skin is warm, dry and intact.   Psychiatric: She has a normal mood and affect.     Blood pressure 138/88, pulse 88, temperature 97.9 °F (36.6 °C), temperature source Oral, resp. rate 18, height 4' 10" (1.473 m), weight 61.6 kg (135 lb 12.9 oz).Body mass index is 28.38 kg/(m^2).          A/P:  1)  diabetes.  New to me.  Status unknown.  We will check fasting labs today and a urine microalbumin.  We will schedule a diabetic eye exam.    2)  anemia.  New to me.  Status unknown.  We will check a CBC.  If she continues to have anemia, we will refer her to hematology  3)  CAD.  New to me.  Asymptomatic.  I did refill her nitroglycerin.  She will continue with her other cardiac medications and follow up with cardiology as planned   4)  chronic kidney disease.  New to me.  Status unknown.  We will check a urine protein creatinine ratio and labs.  If her " labs are abnormal, we will refer her to nephrology  5)  hyperlipidemia.  New to me.  Status unknown.  We will check fasting labs today  6)  hypertension.  New to me.  Well-controlled.  Continue current medication  7)  history of occipital neuralgias.  New to me.  Asymptomatic.  Continue with Tegretol  8)  history of gout.  New to me.  Asymptomatic.  We will check uric acid level today  9)  osteoporosis.  New to me.  Status unknown.  We will check a DEXA scan soon.  We will check a vitamin D today  10)  Health maintainece issues.  We will administer prevnar today    Total visit time was 55 minutes   Greater than 50% of this time was spent couseling the patient or coordinating their care for the above issues and reviewing her chart

## 2017-05-15 NOTE — LETTER
May 15, 2017      Codi Jung MD  [address] [address] [address]           [facility] [address] [facility] [address] [place] [address]  Phone: [phone]          Patient: Kristen Nicole   MR Number: [id number]   YOB: 1923   Date of Visit: 5/15/2017       Dear Dr. Codi Jung:    Thank you for referring Kristen Nicole to me for evaluation. Attached you will find relevant portions of my assessment and plan of care.    If you have questions, please do not hesitate to call me. I look forward to following Kristen sanches with you.    Sincerely,    Harry Robles MD    Enclosure  CC:  No Recipients    If you would like to receive this communication electronically, please contact [email or web address] or [phone] to request more information on [facility] Link access.    For providers and/or their staff who would like to refer a patient to [facility], please contact us through our one-stop-shop provider referral line, [facility], at [phone].    If you feel you have received this communication in error or would no longer like to receive these types of communications, please e-mail [email or web address]

## 2017-05-15 NOTE — MR AVS SNAPSHOT
[facility]  [address] [facility] [address]  [facility] [address]  Phone: [phone]                  Kristen Nicole   5/15/2017 8:40 AM   Office Visit    Description:  Female : 1923   Provider:  Harry Robles MD   Department:  [facility] Neph[facility]                To Do List           Future Appointments        Provider Department Dept Phone    5/15/2017 8:40 AM Harry Robles MD [facility] [phone]    2017 8:50 AM Codi Jung MD [facility] [phone]    2017 9:15 AM SHAHZAD Watson OD [facility] Opt[facility] [phone]    2017 10:40 AM [facility] DEXA1 [facility] [phone]      Goals (5 Years of Data)     None      [facility]     [facility] Nurse Care Line -  Assistance  Unless otherwise directed by your provider, please contact [facility], our nurse care line that is available for  assistance.     Registered nurses in the [facility] Center provide: appointment scheduling, clinical advisement, health education, and other advisory services.  Call: [phone] (toll free)               Medications           Message regarding Medications     Verify the changes and/or additions to your medication regime listed below are the same as discussed with your clinician today.  If any of these changes or additions are incorrect, please notify your healthcare provider.             Verify that the below list of medications is an accurate representation of the medications you are currently taking.  If none reported, the list may be blank. If incorrect, please contact your healthcare provider. Carry this list with you in case of emergency.           Current Medications     alprazolam (XANAX) 0.25 MG tablet Take 1 tablet (0.25 mg total) by mouth 2 (two) times daily as needed for Anxiety.    aspirin (ECOTRIN) 81 MG EC tablet Take 81 mg by mouth once daily.    blood sugar diagnostic Strp 1 strip by  "Misc.(Non-Drug; Combo Route) route once daily. Accu-check Plus Test Strips Drum    carbamazepine (TEGRETOL XR) 100 MG 12 hr tablet TAKE 1 TABLET TWICE DAILY    clopidogrel (PLAVIX) 75 mg tablet TAKE 1 TABLET ONE TIME DAILY    cranberry 400 mg Cap Take 500 mg by mouth once daily.     felodipine (PLENDIL) 5 MG 24 hr tablet Take 1 tablet (5 mg total) by mouth every evening.    ferrous sulfate 325 mg (65 mg iron) Tab tablet Take 1 tablet (325 mg total) by mouth once daily. Try on empty stomach first, can take with food if GI upset.    fluticasone (FLONASE) 50 mcg/actuation nasal spray 2 sprays by Each Nare route once daily.    furosemide (LASIX) 20 MG tablet Take 1 tablet (20 mg total) by mouth once daily.    GINGER ROOT, BULK, MISC 550 mg by Misc.(Non-Drug; Combo Route) route once daily.    hydrALAZINE (APRESOLINE) 25 MG tablet Take 1 tablet (25 mg total) by mouth every 12 (twelve) hours.    ipratropium (ATROVENT HFA) 17 mcg/actuation inhaler Inhale 2 puffs into the lungs every 6 (six) hours.    isosorbide mononitrate (IMDUR) 60 MG 24 hr tablet Take 1 tablet (60 mg total) by mouth every evening.    labetalol (NORMODYNE) 200 MG tablet Take 1 tablet (200 mg total) by mouth every 12 (twelve) hours.    multivitamin (ONE DAILY MULTIVITAMIN) per tablet Take 1 tablet by mouth once daily.    nitroGLYCERIN (NITROSTAT) 0.4 MG SL tablet Place 1 tablet (0.4 mg total) under the tongue every 5 (five) minutes as needed for Chest pain.    omega-3 acid ethyl esters (LOVAZA) 1 gram capsule Take 1 g by mouth 2 (two) times daily.    omeprazole (PRILOSEC) 20 MG capsule Take 1 capsule (20 mg total) by mouth once daily.    simvastatin (ZOCOR) 20 MG tablet Take 1 tablet (20 mg total) by mouth every evening.           Clinical Reference Information           Your Vitals Were     BP Pulse Temp Height Weight Last Period    170/62 (BP Location: Left arm, Patient Position: Sitting) 65 97.7 °F (36.5 °C) 4' 10" (1.473 m) 62.3 kg (137 lb 5.6 oz) (LMP " Unknown)    SpO2 BMI             98% 28.71 kg/m2         Blood Pressure          Most Recent Value    BP  (!)  170/62      Allergies as of 5/15/2017     Albuterol    Influenza A (H1n1) Vac 09 (Pf)    Nortriptyline    Pneumococcal Vaccine    Prevnar 13 [Pneumoc 13-abram Conj-dip Cr(pf)]      Immunizations Administered on Date of Encounter - 5/15/2017     None      Language Assistance Services     ATTENTION: Language assistance services are available, free of charge. Please call [phone].      ATENCIÓN: Si habla latosha, tiene a haywood disposición servicios gratuitos de asistencia lingüística. Llame al [phone].     [facility] Ý: N?u b?n nói Ti?ng Vi?t, có các d?ch v? h? tr? ngôn ng? mi?n phí dành cho b?n. G?i s? [phone].         [facility] complies with applicable Federal civil rights laws and does not discriminate on the basis of race, color, national origin, age, disability, or sex.

## 2017-05-16 DIAGNOSIS — J45.901 ASTHMA WITH ACUTE EXACERBATION, UNSPECIFIED ASTHMA SEVERITY: Primary | ICD-10-CM

## 2017-05-16 RX ORDER — NEBULIZER AND COMPRESSOR
EACH MISCELLANEOUS
Qty: 1 EACH | Refills: 0 | Status: SHIPPED | OUTPATIENT
Start: 2017-05-16 | End: 2017-05-18 | Stop reason: SDUPTHER

## 2017-05-16 NOTE — TELEPHONE ENCOUNTER
----- Message from Patricia Rivera sent at 5/16/2017 10:50 AM CDT -----  Contact: Daughter , Destiny  Please call Destiny, patient cannot leave the house without her Nebulizer. There is a portable nebulizer that [facility] will cover if you approve. Please send an order for portable, battery operated Nebulizer to [facility]. Any questions call Destiny at [phone].

## 2017-05-16 NOTE — TELEPHONE ENCOUNTER
Pt has nebulizer at home. Pt wants to be able to leave house and have a nebulizer. Pt daughter says [facility] will cover portable battery operated nebulizer. Pt daughter  says pt has asthma . --lp

## 2017-05-19 RX ORDER — NEBULIZER AND COMPRESSOR
EACH MISCELLANEOUS
Qty: 1 EACH | Refills: 0 | Status: SHIPPED | OUTPATIENT
Start: 2017-05-19 | End: 2018-01-01

## 2017-05-23 NOTE — TELEPHONE ENCOUNTER
Pt aware order ready to be picked up . She will need to bring this to medical supply store( such as [employer] ) .--lp

## 2017-05-25 ENCOUNTER — TELEPHONE (OUTPATIENT)
Dept: FAMILY MEDICINE | Facility: CLINIC | Age: 82
End: 2017-05-25

## 2017-05-25 NOTE — TELEPHONE ENCOUNTER
Phoned pt's daughter, Destiny in regards to message below. Destiny states they got the pt's nebulizer from [facility] and the battery  on it. The company was contacted and said that machine was out of date 4 years ago. So they brought the machine back to [facility] and are looking for another supplier. Instructed Mrs. Dixon to try [facility] for the nebulizer. Destiny states they will try them, Instructed Mrs. Dixon to let us know if we can be of any assistance. Destiny voiced understanding. CLC

## 2017-05-25 NOTE — TELEPHONE ENCOUNTER
----- Message from Kae Zimmer sent at 5/25/2017 11:40 AM CDT -----  Patient is returning office call. She states she picked up her nebulizer prescription this morning. Please call for questions at [phone].

## 2017-06-07 ENCOUNTER — DOCUMENTATION ONLY (OUTPATIENT)
Dept: FAMILY MEDICINE | Facility: CLINIC | Age: 82
End: 2017-06-07

## 2017-06-07 NOTE — PROGRESS NOTES
Health Maintenance Due   Topic Date Due    Zoster Vaccine  12/04/1983    Eye Exam  04/05/2017

## 2017-06-08 ENCOUNTER — OFFICE VISIT (OUTPATIENT)
Dept: FAMILY MEDICINE | Facility: CLINIC | Age: 82
End: 2017-06-08
Payer: MEDICARE

## 2017-06-08 ENCOUNTER — LAB VISIT (OUTPATIENT)
Dept: LAB | Facility: HOSPITAL | Age: 82
End: 2017-06-08
Attending: INTERNAL MEDICINE
Payer: MEDICARE

## 2017-06-08 VITALS
RESPIRATION RATE: 20 BRPM | OXYGEN SATURATION: 97 % | DIASTOLIC BLOOD PRESSURE: 72 MMHG | HEART RATE: 60 BPM | SYSTOLIC BLOOD PRESSURE: 138 MMHG | WEIGHT: 137.38 LBS | TEMPERATURE: 98 F | BODY MASS INDEX: 28.84 KG/M2 | HEIGHT: 58 IN

## 2017-06-08 DIAGNOSIS — E11.69 HYPERLIPIDEMIA ASSOCIATED WITH TYPE 2 DIABETES MELLITUS: ICD-10-CM

## 2017-06-08 DIAGNOSIS — E11.8 TYPE 2 DIABETES MELLITUS WITH COMPLICATION, WITHOUT LONG-TERM CURRENT USE OF INSULIN: ICD-10-CM

## 2017-06-08 DIAGNOSIS — N17.9 ACUTE RENAL FAILURE, UNSPECIFIED ACUTE RENAL FAILURE TYPE: ICD-10-CM

## 2017-06-08 DIAGNOSIS — I15.2 HYPERTENSION ASSOCIATED WITH DIABETES: ICD-10-CM

## 2017-06-08 DIAGNOSIS — M10.9 GOUT, UNSPECIFIED CAUSE, UNSPECIFIED CHRONICITY, UNSPECIFIED SITE: Primary | ICD-10-CM

## 2017-06-08 DIAGNOSIS — M10.9 GOUT, UNSPECIFIED CAUSE, UNSPECIFIED CHRONICITY, UNSPECIFIED SITE: ICD-10-CM

## 2017-06-08 DIAGNOSIS — N18.30 CHRONIC KIDNEY DISEASE, STAGE III (MODERATE): ICD-10-CM

## 2017-06-08 DIAGNOSIS — E11.59 HYPERTENSION ASSOCIATED WITH DIABETES: ICD-10-CM

## 2017-06-08 DIAGNOSIS — I70.0 AORTIC CALCIFICATION: ICD-10-CM

## 2017-06-08 DIAGNOSIS — I27.20 PULMONARY HYPERTENSION: ICD-10-CM

## 2017-06-08 DIAGNOSIS — E78.5 HYPERLIPIDEMIA ASSOCIATED WITH TYPE 2 DIABETES MELLITUS: ICD-10-CM

## 2017-06-08 LAB
ALBUMIN SERPL BCP-MCNC: 3.9 G/DL
ANION GAP SERPL CALC-SCNC: 9 MMOL/L
BUN SERPL-MCNC: 33 MG/DL
CALCIUM SERPL-MCNC: 9.6 MG/DL
CHLORIDE SERPL-SCNC: 106 MMOL/L
CO2 SERPL-SCNC: 22 MMOL/L
CREAT SERPL-MCNC: 1.6 MG/DL
EST. GFR  (AFRICAN AMERICAN): 31.8 ML/MIN/1.73 M^2
EST. GFR  (NON AFRICAN AMERICAN): 27.6 ML/MIN/1.73 M^2
GLUCOSE SERPL-MCNC: 117 MG/DL
PHOSPHATE SERPL-MCNC: 3.9 MG/DL
POTASSIUM SERPL-SCNC: 5.3 MMOL/L
SODIUM SERPL-SCNC: 137 MMOL/L
URATE SERPL-MCNC: 8.9 MG/DL

## 2017-06-08 PROCEDURE — 36415 COLL VENOUS BLD VENIPUNCTURE: CPT | Mod: PO

## 2017-06-08 PROCEDURE — 99214 OFFICE O/P EST MOD 30 MIN: CPT | Mod: S$GLB,,, | Performed by: FAMILY MEDICINE

## 2017-06-08 PROCEDURE — 84550 ASSAY OF BLOOD/URIC ACID: CPT

## 2017-06-08 PROCEDURE — 80069 RENAL FUNCTION PANEL: CPT

## 2017-06-08 PROCEDURE — 1126F AMNT PAIN NOTED NONE PRSNT: CPT | Mod: S$GLB,,, | Performed by: FAMILY MEDICINE

## 2017-06-08 PROCEDURE — 1159F MED LIST DOCD IN RCRD: CPT | Mod: S$GLB,,, | Performed by: FAMILY MEDICINE

## 2017-06-08 PROCEDURE — 1157F ADVNC CARE PLAN IN RCRD: CPT | Mod: S$GLB,,, | Performed by: FAMILY MEDICINE

## 2017-06-08 PROCEDURE — 99499 UNLISTED E&M SERVICE: CPT | Mod: S$GLB,,, | Performed by: FAMILY MEDICINE

## 2017-06-08 RX ORDER — TOLTERODINE 4 MG/1
4 CAPSULE, EXTENDED RELEASE ORAL DAILY
Qty: 30 CAPSULE | Refills: 3 | Status: SHIPPED | OUTPATIENT
Start: 2017-06-08 | End: 2017-07-12 | Stop reason: SDUPTHER

## 2017-06-08 RX ORDER — INSULIN PUMP SYRINGE, 3 ML
EACH MISCELLANEOUS
Qty: 1 EACH | Refills: 0 | Status: SHIPPED | OUTPATIENT
Start: 2017-06-08 | End: 2017-12-16

## 2017-06-09 ENCOUNTER — TELEPHONE (OUTPATIENT)
Dept: FAMILY MEDICINE | Facility: CLINIC | Age: 82
End: 2017-06-09

## 2017-06-09 DIAGNOSIS — M10.9 GOUT, UNSPECIFIED CAUSE, UNSPECIFIED CHRONICITY, UNSPECIFIED SITE: Primary | ICD-10-CM

## 2017-06-09 RX ORDER — ALLOPURINOL 100 MG/1
100 TABLET ORAL DAILY
Qty: 30 TABLET | Refills: 1 | Status: SHIPPED | OUTPATIENT
Start: 2017-06-09 | End: 2017-07-26 | Stop reason: SDUPTHER

## 2017-06-09 NOTE — TELEPHONE ENCOUNTER
Notified patient of results, verbalized understanding and agreed to gout medication. Please advise.

## 2017-06-09 NOTE — TELEPHONE ENCOUNTER
Pls notify pt:    The uric acid (or gout test) was very elevated on labs done yesterday  Are you interested in starting a medication to decrease the uric acid levels to prevent gouty episodes in the future?

## 2017-06-12 NOTE — TELEPHONE ENCOUNTER
Notified pt of orders per provider. Pt stated verbal understanding. Labs scheduled. Time and date verified with pt.

## 2017-06-13 NOTE — PROGRESS NOTES
Subjective:       Patient ID: Kristen Nicole is a 89 y.o. female.    Chief Complaint: Follow-up    HPI   The patient is an 89-year-old who is here today for follow-up.  Today is our second visit together.  She has had several tests done and we reviewed those results today.  Today we also discussed the followin)  diabetes.  Her recent A1c was 7.0.  She does not check her sugars at home.  Her glucometer is over 2 years old.  She does not follow a healthy diet and eats candy a lot at nighttime.  2)  chronic kidney disease.  She recently established with a new nephrologist.  We did discuss her recent test results.  The nephrologist did order a repeat chemistry panel today which she is going to have done after this visit  3)  hyperlipidemia.  We did review her recent cholesterol panel which looked good.  She is doing well with her Crestor  4)  bladder issues.  She has had problems with her bladder for years.  She urinates frequently during the day and the night.  Sometimes she will start urinating as soon as  she has a sensation that she has to urinate.  She notes that her urine has a tendency to smell very strong.  She denies any dysuria or hematuria      Review of Systems   Constitutional: Negative for appetite change, chills, diaphoresis, fatigue, fever and unexpected weight change.   HENT: Negative for congestion, ear pain, postnasal drip, rhinorrhea, sinus pressure, sneezing, sore throat and trouble swallowing.    Eyes: Negative for pain, discharge and visual disturbance.   Respiratory: Negative for cough, chest tightness, shortness of breath and wheezing.    Cardiovascular: Negative for chest pain, palpitations and leg swelling.   Gastrointestinal: Negative for abdominal distention, abdominal pain, blood in stool, constipation, diarrhea, nausea and vomiting.   Skin: Negative for rash.       Objective:      Physical Exam   Constitutional: She is oriented to person, place, and time. She appears  well-developed and well-nourished. No distress.   HENT:   Head: Normocephalic and atraumatic.   Right Ear: Hearing, tympanic membrane, external ear and ear canal normal.   Left Ear: Hearing, tympanic membrane, external ear and ear canal normal.   Nose: Nose normal.   Mouth/Throat: Oropharynx is clear and moist and mucous membranes are normal. No oral lesions. No oropharyngeal exudate, posterior oropharyngeal edema or posterior oropharyngeal erythema.   Eyes: Conjunctivae, EOM and lids are normal. Pupils are equal, round, and reactive to light. No scleral icterus.   Neck: Normal range of motion. Neck supple. Carotid bruit is not present. No thyroid mass and no thyromegaly present.   Cardiovascular: Normal rate and regular rhythm.   No extrasystoles are present. PMI is not displaced.  Exam reveals no gallop.    Murmur heard.   Systolic murmur is present with a grade of 2/6   Pulmonary/Chest: Effort normal and breath sounds normal. No accessory muscle usage. No respiratory distress.   Clear to auscultation bilaterally.   Abdominal: Soft. Normal appearance and bowel sounds are normal. She exhibits no abdominal bruit. There is no hepatosplenomegaly. There is no tenderness. There is no rebound.   Lymphadenopathy:        Head (right side): No submental and no submandibular adenopathy present.        Head (left side): No submental and no submandibular adenopathy present.        Right cervical: No superficial cervical, no deep cervical and no posterior cervical adenopathy present.       Left cervical: No superficial cervical, no deep cervical and no posterior cervical adenopathy present.        Right: No supraclavicular adenopathy present.        Left: No supraclavicular adenopathy present.   Neurological: She is alert and oriented to person, place, and time.   Skin: Skin is warm, dry and intact.   Psychiatric: She has a normal mood and affect.     Blood pressure 138/72, pulse 60, temperature 97.8 °F (36.6 °C), temperature  "source Oral, resp. rate 20, height 4' 10" (1.473 m), weight 62.3 kg (137 lb 5.6 oz), SpO2 97 %.Body mass index is 28.71 kg/m².        A/P:  1)  diabetes.  Fairly well controlled.  She will start checking her sugars 3 times a week.  If her fasting sugars higher than 140 consistently, she will let me know.  She'll work on improving her diet and losing weight.  We will check an A1c in 3 months  2)  chronic kidney disease.  Stable.  Follow up with nephrology as planned  3)  hyperlipidemia.  Well controlled.  Continue with Crestor.  We will recheck labs in 6 months   4)  OAB with urge incontinence.  Chronic but new to me.  We will check a UA and urine culture.  We will try Detrol.  If this is not effective, she will let me know  5)  osteoporosis.  Status unknown.  We'll check a DEXA scan  6)  history of gout with hyperuricemia.  Status unknown.  We will check uric acid level with labs today  7)  aortic calcification noted on chest x-ray.  Asymptomatic.  We will address all of her risk factors for further atherosclerotic progression  8)   pulmonary hypertension.  Follow-up with cardiology as planned  "

## 2017-06-20 ENCOUNTER — OFFICE VISIT (OUTPATIENT)
Dept: OPTOMETRY | Facility: CLINIC | Age: 82
End: 2017-06-20
Payer: MEDICARE

## 2017-06-20 ENCOUNTER — TELEPHONE (OUTPATIENT)
Dept: FAMILY MEDICINE | Facility: CLINIC | Age: 82
End: 2017-06-20

## 2017-06-20 ENCOUNTER — HOSPITAL ENCOUNTER (OUTPATIENT)
Dept: RADIOLOGY | Facility: HOSPITAL | Age: 82
Discharge: HOME | End: 2017-06-20
Attending: FAMILY MEDICINE
Payer: MEDICARE

## 2017-06-20 DIAGNOSIS — Z13.5 GLAUCOMA SCREENING: ICD-10-CM

## 2017-06-20 DIAGNOSIS — H52.209 HYPEROPIA WITH ASTIGMATISM, UNSPECIFIED LATERALITY: ICD-10-CM

## 2017-06-20 DIAGNOSIS — I15.2 HYPERTENSION ASSOCIATED WITH DIABETES: ICD-10-CM

## 2017-06-20 DIAGNOSIS — H43.813 POSTERIOR VITREOUS DETACHMENT, BILATERAL: ICD-10-CM

## 2017-06-20 DIAGNOSIS — E11.59 HYPERTENSION ASSOCIATED WITH DIABETES: ICD-10-CM

## 2017-06-20 DIAGNOSIS — E11.8 CONTROLLED TYPE 2 DIABETES MELLITUS WITH COMPLICATION, WITHOUT LONG-TERM CURRENT USE OF INSULIN: ICD-10-CM

## 2017-06-20 DIAGNOSIS — E78.5 HYPERLIPIDEMIA ASSOCIATED WITH TYPE 2 DIABETES MELLITUS: ICD-10-CM

## 2017-06-20 DIAGNOSIS — E11.9 DIABETES MELLITUS TYPE 2 WITHOUT RETINOPATHY: Primary | ICD-10-CM

## 2017-06-20 DIAGNOSIS — E11.69 HYPERLIPIDEMIA ASSOCIATED WITH TYPE 2 DIABETES MELLITUS: ICD-10-CM

## 2017-06-20 DIAGNOSIS — H52.00 HYPEROPIA WITH ASTIGMATISM, UNSPECIFIED LATERALITY: ICD-10-CM

## 2017-06-20 DIAGNOSIS — M81.0 OSTEOPOROSIS, UNSPECIFIED OSTEOPOROSIS TYPE, UNSPECIFIED PATHOLOGICAL FRACTURE PRESENCE: ICD-10-CM

## 2017-06-20 DIAGNOSIS — H31.001 CHORIORETINAL SCAR OF RIGHT EYE: ICD-10-CM

## 2017-06-20 DIAGNOSIS — Z96.1 BILATERAL PSEUDOPHAKIA: ICD-10-CM

## 2017-06-20 PROCEDURE — 77080 DXA BONE DENSITY AXIAL: CPT | Mod: 26,,, | Performed by: RADIOLOGY

## 2017-06-20 PROCEDURE — 92015 DETERMINE REFRACTIVE STATE: CPT | Mod: S$GLB,,, | Performed by: OPTOMETRIST

## 2017-06-20 PROCEDURE — 99999 PR PBB SHADOW E&M-EST. PATIENT-LVL III: CPT | Mod: PBBFAC,,, | Performed by: OPTOMETRIST

## 2017-06-20 PROCEDURE — 77080 DXA BONE DENSITY AXIAL: CPT | Mod: TC,PO

## 2017-06-20 PROCEDURE — 92014 COMPRE OPH EXAM EST PT 1/>: CPT | Mod: S$GLB,,, | Performed by: OPTOMETRIST

## 2017-06-20 PROCEDURE — 99499 UNLISTED E&M SERVICE: CPT | Mod: S$GLB,,, | Performed by: OPTOMETRIST

## 2017-06-20 NOTE — LETTER
June 20, 2017      Codi Jung MD  [address] [address] [address]           [facility]  [address] [facility] [address] [place] [address]  Phone: [phone]  Fax: [phone]          Patient: Kristen Nicole   MR Number: [id number]   YOB: 1923   Date of Visit: 6/20/2017       Dear Dr. Codi Jung:    Thank you for referring Kristen Nicole to me for evaluation. Attached you will find relevant portions of my assessment and plan of care.    If you have questions, please do not hesitate to call me. I look forward to following Kristen Nicole along with you.    Sincerely,    SHAHZAD Watson, OD    Enclosure  CC:  No Recipients    If you would like to receive this communication electronically, please contact [email or web address] or [phone] to request more information on [employer] Link access.    For providers and/or their staff who would like to refer a patient to [facility], please contact us through our one-stop-shop provider referral line, [facility], at [phone].    If you feel you have received this communication in error or would no longer like to receive these types of communications, please e-mail [email or web address]

## 2017-06-20 NOTE — PROGRESS NOTES
HPI     Annual Exam    Additional comments: DLE 4-16 (amauri)    ocular health exam            Diabetic Eye Exam    Additional comments: BSL fluctuates           Blurred Vision    Additional comments: at both near & distance            Spots and/or Floaters    Additional comments: OU -- no light flashes           Comments   Hemoglobin A1C       Date                     Value               Ref Range             Status                05/10/2017               7.0 (H)             4.5 - 6.2 %           Final              Comment:    According to ADA guidelines, hemoglobin A1C <7.0% represents  optimal   control in non-pregnant diabetic patients.  Different  metrics may apply   to specific populations.   Standards of Medical Care in Diabetes -   2016.  For the purpose of screening for the presence of diabetes:  <5.7%       Consistent with the absence of diabetes  5.7-6.4%  Consistent with   increasing risk for diabetes   (prediabetes)  >or=6.5%  Consistent with   diabetes  Currently no consensus exists for use of hemoglobin A1C  for   diagnosis of diabetes for children.         12/29/2016               6.3 (H)             0.0 - 5.6 %           Final              Comment:    Reference Interval:  5.0 - 5.6 Normal   5.7 - 6.4 High Risk   > 6.5   Diabetic    Hgb A1c results are standardized based on the (NGSP) National     Glycohemoglobin Standardization Program.    Hemoglobin A1C levels are   related to mean serum/plasma glucose   during the preceding 2-3 months.              10/06/2016               6.8 (H)             0.0 - 5.6 %           Final              Comment:    Reference Interval:  5.0 - 5.6 Normal   5.7 - 6.4 High Risk   > 6.5   Diabetic    Hgb A1c results are standardized based on the (NGSP) National     Glycohemoglobin Standardization Program.    Hemoglobin A1C levels are   related to mean serum/plasma glucose   during the preceding 2-3 months.         ----------         Last edited by Mee Donaldson on  6/20/2017  9:28 AM. (History)            Assessment /Plan     For exam results, see Encounter Report.    Diabetes mellitus type 2 without retinopathy    Posterior vitreous detachment, bilateral    Chorioretinal scar of right eye    Glaucoma screening    Bilateral pseudophakia    Hyperopia with astigmatism, unspecified laterality      1. No ret/ no csme, gave info, control glucose, annual DFE  2. RD precautions given  3. Stable as previous  4. Not suspect  5. OD pc // OS ac iol  6. Updated specs rx, gave copy, fill prn    Discussed and educated patient on current findings /plan.  RTC 1 year, prn if any changes / issues

## 2017-06-21 NOTE — TELEPHONE ENCOUNTER
Pls notify pt:    Dexa shows osteoporosis  Make sure you are taking calcium and vit D supplementation like oscal + D BID  Let's discuss additional tx for this at your next visit

## 2017-07-12 RX ORDER — TOLTERODINE 4 MG/1
4 CAPSULE, EXTENDED RELEASE ORAL DAILY
Qty: 90 CAPSULE | Refills: 0 | Status: SHIPPED | OUTPATIENT
Start: 2017-07-12 | End: 2017-09-01 | Stop reason: SDUPTHER

## 2017-07-15 DIAGNOSIS — I50.33 ACUTE ON CHRONIC DIASTOLIC HEART FAILURE: ICD-10-CM

## 2017-07-15 DIAGNOSIS — J45.901 ASTHMATIC BRONCHITIS WITH ACUTE EXACERBATION: ICD-10-CM

## 2017-07-17 ENCOUNTER — LAB VISIT (OUTPATIENT)
Dept: LAB | Facility: HOSPITAL | Age: 82
End: 2017-07-17
Attending: FAMILY MEDICINE
Payer: MEDICARE

## 2017-07-17 DIAGNOSIS — M10.9 GOUT, UNSPECIFIED CAUSE, UNSPECIFIED CHRONICITY, UNSPECIFIED SITE: ICD-10-CM

## 2017-07-17 LAB
ALBUMIN SERPL BCP-MCNC: 4 G/DL
ALP SERPL-CCNC: 71 U/L
ALT SERPL W/O P-5'-P-CCNC: 13 U/L
ANION GAP SERPL CALC-SCNC: 10 MMOL/L
AST SERPL-CCNC: 18 U/L
BASOPHILS # BLD AUTO: 0.02 K/UL
BASOPHILS NFR BLD: 0.3 %
BILIRUB SERPL-MCNC: 0.4 MG/DL
BUN SERPL-MCNC: 31 MG/DL
CALCIUM SERPL-MCNC: 10.2 MG/DL
CHLORIDE SERPL-SCNC: 105 MMOL/L
CO2 SERPL-SCNC: 24 MMOL/L
CREAT SERPL-MCNC: 1.6 MG/DL
DIFFERENTIAL METHOD: ABNORMAL
EOSINOPHIL # BLD AUTO: 0.2 K/UL
EOSINOPHIL NFR BLD: 3.5 %
ERYTHROCYTE [DISTWIDTH] IN BLOOD BY AUTOMATED COUNT: 13.8 %
EST. GFR  (AFRICAN AMERICAN): 31.8 ML/MIN/1.73 M^2
EST. GFR  (NON AFRICAN AMERICAN): 27.6 ML/MIN/1.73 M^2
GLUCOSE SERPL-MCNC: 141 MG/DL
HCT VFR BLD AUTO: 34.5 %
HGB BLD-MCNC: 11.4 G/DL
LYMPHOCYTES # BLD AUTO: 2.2 K/UL
LYMPHOCYTES NFR BLD: 35 %
MCH RBC QN AUTO: 31.1 PG
MCHC RBC AUTO-ENTMCNC: 33 %
MCV RBC AUTO: 94 FL
MONOCYTES # BLD AUTO: 0.5 K/UL
MONOCYTES NFR BLD: 8 %
NEUTROPHILS # BLD AUTO: 3.4 K/UL
NEUTROPHILS NFR BLD: 52.7 %
PLATELET # BLD AUTO: 209 K/UL
PMV BLD AUTO: 11 FL
POTASSIUM SERPL-SCNC: 4.8 MMOL/L
PROT SERPL-MCNC: 7.1 G/DL
RBC # BLD AUTO: 3.66 M/UL
SODIUM SERPL-SCNC: 139 MMOL/L
URATE SERPL-MCNC: 6.5 MG/DL
WBC # BLD AUTO: 6.37 K/UL

## 2017-07-17 PROCEDURE — 84550 ASSAY OF BLOOD/URIC ACID: CPT

## 2017-07-17 PROCEDURE — 80053 COMPREHEN METABOLIC PANEL: CPT

## 2017-07-17 PROCEDURE — 36415 COLL VENOUS BLD VENIPUNCTURE: CPT | Mod: PO

## 2017-07-17 PROCEDURE — 85025 COMPLETE CBC W/AUTO DIFF WBC: CPT

## 2017-07-17 RX ORDER — IPRATROPIUM BROMIDE 17 UG/1
2 AEROSOL, METERED RESPIRATORY (INHALATION) EVERY 6 HOURS
Qty: 4 INHALER | Refills: 3 | Status: SHIPPED | OUTPATIENT
Start: 2017-07-17 | End: 2017-12-16

## 2017-07-21 ENCOUNTER — TELEPHONE (OUTPATIENT)
Dept: RESEARCH | Facility: HOSPITAL | Age: 82
End: 2017-07-21

## 2017-07-21 DIAGNOSIS — E79.0 HYPERURICEMIA: Primary | ICD-10-CM

## 2017-07-21 NOTE — TELEPHONE ENCOUNTER
Pls notify pt:    Uric acid level has improved  CBC again shows mild anemia  CMP is normal except for kidney tests as noted before    Let's CPM and recheck some of these tests with next labs in Sept  (pls add today's ordered labs to next lab appt)

## 2017-07-26 RX ORDER — ALLOPURINOL 100 MG/1
100 TABLET ORAL DAILY
Qty: 90 TABLET | Refills: 1 | Status: SHIPPED | OUTPATIENT
Start: 2017-07-26 | End: 2018-02-07 | Stop reason: SDUPTHER

## 2017-07-26 NOTE — TELEPHONE ENCOUNTER
Notified pt's daughter of results. Daughter stated verbal understanding. Daughter is requesting 90 day rx refill. Rx pended. Please advise.

## 2017-09-02 RX ORDER — TOLTERODINE 4 MG/1
CAPSULE, EXTENDED RELEASE ORAL
Qty: 90 CAPSULE | Refills: 0 | Status: SHIPPED | OUTPATIENT
Start: 2017-09-02 | End: 2018-02-07 | Stop reason: SDUPTHER

## 2017-09-12 ENCOUNTER — LAB VISIT (OUTPATIENT)
Dept: LAB | Facility: HOSPITAL | Age: 82
End: 2017-09-12
Attending: FAMILY MEDICINE
Payer: MEDICARE

## 2017-09-12 DIAGNOSIS — E79.0 HYPERURICEMIA: ICD-10-CM

## 2017-09-12 DIAGNOSIS — E11.8 TYPE 2 DIABETES MELLITUS WITH COMPLICATION, WITHOUT LONG-TERM CURRENT USE OF INSULIN: ICD-10-CM

## 2017-09-12 LAB
ANION GAP SERPL CALC-SCNC: 8 MMOL/L
BASOPHILS # BLD AUTO: 0.03 K/UL
BASOPHILS NFR BLD: 0.5 %
BUN SERPL-MCNC: 37 MG/DL
CALCIUM SERPL-MCNC: 9.9 MG/DL
CHLORIDE SERPL-SCNC: 104 MMOL/L
CO2 SERPL-SCNC: 27 MMOL/L
CREAT SERPL-MCNC: 1.7 MG/DL
DIFFERENTIAL METHOD: ABNORMAL
EOSINOPHIL # BLD AUTO: 0.3 K/UL
EOSINOPHIL NFR BLD: 4.2 %
ERYTHROCYTE [DISTWIDTH] IN BLOOD BY AUTOMATED COUNT: 15 %
EST. GFR  (AFRICAN AMERICAN): 29.5 ML/MIN/1.73 M^2
EST. GFR  (NON AFRICAN AMERICAN): 25.6 ML/MIN/1.73 M^2
ESTIMATED AVG GLUCOSE: 140 MG/DL
GLUCOSE SERPL-MCNC: 134 MG/DL
HBA1C MFR BLD HPLC: 6.5 %
HCT VFR BLD AUTO: 32.4 %
HGB BLD-MCNC: 10.8 G/DL
LYMPHOCYTES # BLD AUTO: 2.1 K/UL
LYMPHOCYTES NFR BLD: 35.6 %
MCH RBC QN AUTO: 30.9 PG
MCHC RBC AUTO-ENTMCNC: 33.3 G/DL
MCV RBC AUTO: 93 FL
MONOCYTES # BLD AUTO: 0.6 K/UL
MONOCYTES NFR BLD: 9.8 %
NEUTROPHILS # BLD AUTO: 2.9 K/UL
NEUTROPHILS NFR BLD: 49.6 %
PLATELET # BLD AUTO: 230 K/UL
PMV BLD AUTO: 10.7 FL
POTASSIUM SERPL-SCNC: 4.9 MMOL/L
RBC # BLD AUTO: 3.49 M/UL
SODIUM SERPL-SCNC: 139 MMOL/L
URATE SERPL-MCNC: 6.1 MG/DL
WBC # BLD AUTO: 5.92 K/UL

## 2017-09-12 PROCEDURE — 80048 BASIC METABOLIC PNL TOTAL CA: CPT

## 2017-09-12 PROCEDURE — 36415 COLL VENOUS BLD VENIPUNCTURE: CPT | Mod: PO

## 2017-09-12 PROCEDURE — 83036 HEMOGLOBIN GLYCOSYLATED A1C: CPT

## 2017-09-12 PROCEDURE — 85025 COMPLETE CBC W/AUTO DIFF WBC: CPT

## 2017-09-12 PROCEDURE — 84550 ASSAY OF BLOOD/URIC ACID: CPT

## 2017-09-13 ENCOUNTER — TELEPHONE (OUTPATIENT)
Dept: FAMILY MEDICINE | Facility: CLINIC | Age: 82
End: 2017-09-13

## 2017-09-13 NOTE — TELEPHONE ENCOUNTER
Pls notify pt:    Overall, labs are stable  DM is doing well  Uric Acid levels are a bit high  Kidney tests are a bit low but stable  Anemia levels are a bit lower than before    Be sure to keep upcoming appt so we can discuss these results further

## 2017-09-13 NOTE — TELEPHONE ENCOUNTER
Attempted to contact both numbers. 1st number not available. 2nd number was not for the daughter on the Involvement of care.

## 2017-09-14 NOTE — TELEPHONE ENCOUNTER
Pt voiced understanding of her lab results and states she will be here for her upcoming appt on 9/19/17.

## 2017-09-19 ENCOUNTER — OFFICE VISIT (OUTPATIENT)
Dept: FAMILY MEDICINE | Facility: CLINIC | Age: 82
End: 2017-09-19
Payer: MEDICARE

## 2017-09-19 VITALS
SYSTOLIC BLOOD PRESSURE: 170 MMHG | WEIGHT: 134.81 LBS | TEMPERATURE: 98 F | DIASTOLIC BLOOD PRESSURE: 84 MMHG | HEART RATE: 68 BPM | BODY MASS INDEX: 28.3 KG/M2 | HEIGHT: 58 IN

## 2017-09-19 DIAGNOSIS — M10.9 GOUT, UNSPECIFIED CAUSE, UNSPECIFIED CHRONICITY, UNSPECIFIED SITE: ICD-10-CM

## 2017-09-19 DIAGNOSIS — H61.20 IMPACTED CERUMEN, UNSPECIFIED LATERALITY: ICD-10-CM

## 2017-09-19 DIAGNOSIS — N18.4 CKD (CHRONIC KIDNEY DISEASE) STAGE 4, GFR 15-29 ML/MIN: ICD-10-CM

## 2017-09-19 DIAGNOSIS — D53.9 NUTRITIONAL ANEMIA: ICD-10-CM

## 2017-09-19 DIAGNOSIS — F41.1 GENERALIZED ANXIETY DISORDER: ICD-10-CM

## 2017-09-19 DIAGNOSIS — D64.9 ANEMIA, UNSPECIFIED TYPE: ICD-10-CM

## 2017-09-19 DIAGNOSIS — E11.8 TYPE 2 DIABETES MELLITUS WITH COMPLICATION, WITHOUT LONG-TERM CURRENT USE OF INSULIN: Primary | ICD-10-CM

## 2017-09-19 DIAGNOSIS — E79.0 HYPERURICEMIA: ICD-10-CM

## 2017-09-19 LAB
BACTERIA #/AREA URNS AUTO: NORMAL /HPF
BASOPHILS # BLD AUTO: 0.02 K/UL
BASOPHILS NFR BLD: 0.4 %
BILIRUB UR QL STRIP: NEGATIVE
CLARITY UR REFRACT.AUTO: CLEAR
COLOR UR AUTO: YELLOW
DIFFERENTIAL METHOD: ABNORMAL
EOSINOPHIL # BLD AUTO: 0.2 K/UL
EOSINOPHIL NFR BLD: 3.2 %
ERYTHROCYTE [DISTWIDTH] IN BLOOD BY AUTOMATED COUNT: 14.7 %
FERRITIN SERPL-MCNC: 63 NG/ML
FOLATE SERPL-MCNC: 18.3 NG/ML
GLUCOSE UR QL STRIP: NEGATIVE
HCT VFR BLD AUTO: 32.8 %
HGB BLD-MCNC: 10.7 G/DL
HGB UR QL STRIP: NEGATIVE
IRON SERPL-MCNC: 85 UG/DL
KETONES UR QL STRIP: NEGATIVE
LEUKOCYTE ESTERASE UR QL STRIP: ABNORMAL
LYMPHOCYTES # BLD AUTO: 1.8 K/UL
LYMPHOCYTES NFR BLD: 32.3 %
MAGNESIUM SERPL-MCNC: 1.8 MG/DL
MCH RBC QN AUTO: 31 PG
MCHC RBC AUTO-ENTMCNC: 32.6 G/DL
MCV RBC AUTO: 95 FL
MICROSCOPIC COMMENT: NORMAL
MONOCYTES # BLD AUTO: 0.4 K/UL
MONOCYTES NFR BLD: 7.7 %
NEUTROPHILS # BLD AUTO: 3.1 K/UL
NEUTROPHILS NFR BLD: 56.2 %
NITRITE UR QL STRIP: NEGATIVE
PH UR STRIP: 6 [PH] (ref 5–8)
PLATELET # BLD AUTO: 219 K/UL
PMV BLD AUTO: 10.7 FL
PROT UR QL STRIP: NEGATIVE
RBC # BLD AUTO: 3.45 M/UL
SATURATED IRON: 25 %
SP GR UR STRIP: 1.01 (ref 1–1.03)
SQUAMOUS #/AREA URNS AUTO: 0 /HPF
TOTAL IRON BINDING CAPACITY: 336 UG/DL
TRANSFERRIN SERPL-MCNC: 227 MG/DL
URN SPEC COLLECT METH UR: ABNORMAL
UROBILINOGEN UR STRIP-ACNC: NEGATIVE EU/DL
VIT B12 SERPL-MCNC: 856 PG/ML
WBC # BLD AUTO: 5.58 K/UL
WBC #/AREA URNS AUTO: 2 /HPF (ref 0–5)

## 2017-09-19 PROCEDURE — 82607 VITAMIN B-12: CPT

## 2017-09-19 PROCEDURE — 85025 COMPLETE CBC W/AUTO DIFF WBC: CPT

## 2017-09-19 PROCEDURE — 1157F ADVNC CARE PLAN IN RCRD: CPT | Mod: S$GLB,,, | Performed by: FAMILY MEDICINE

## 2017-09-19 PROCEDURE — 87086 URINE CULTURE/COLONY COUNT: CPT

## 2017-09-19 PROCEDURE — 36415 COLL VENOUS BLD VENIPUNCTURE: CPT | Mod: S$GLB,,, | Performed by: FAMILY MEDICINE

## 2017-09-19 PROCEDURE — 3008F BODY MASS INDEX DOCD: CPT | Mod: S$GLB,,, | Performed by: FAMILY MEDICINE

## 2017-09-19 PROCEDURE — 1126F AMNT PAIN NOTED NONE PRSNT: CPT | Mod: S$GLB,,, | Performed by: FAMILY MEDICINE

## 2017-09-19 PROCEDURE — 1159F MED LIST DOCD IN RCRD: CPT | Mod: S$GLB,,, | Performed by: FAMILY MEDICINE

## 2017-09-19 PROCEDURE — 82728 ASSAY OF FERRITIN: CPT

## 2017-09-19 PROCEDURE — 99499 UNLISTED E&M SERVICE: CPT | Mod: S$GLB,,, | Performed by: FAMILY MEDICINE

## 2017-09-19 PROCEDURE — 82746 ASSAY OF FOLIC ACID SERUM: CPT

## 2017-09-19 PROCEDURE — 83540 ASSAY OF IRON: CPT

## 2017-09-19 PROCEDURE — 81001 URINALYSIS AUTO W/SCOPE: CPT

## 2017-09-19 PROCEDURE — 83735 ASSAY OF MAGNESIUM: CPT

## 2017-09-19 PROCEDURE — 99214 OFFICE O/P EST MOD 30 MIN: CPT | Mod: S$GLB,,, | Performed by: FAMILY MEDICINE

## 2017-09-19 RX ORDER — ALPRAZOLAM 0.25 MG/1
0.25 TABLET ORAL DAILY PRN
Qty: 30 TABLET | Refills: 0 | Status: SHIPPED | OUTPATIENT
Start: 2017-09-19 | End: 2017-12-16

## 2017-09-20 ENCOUNTER — TELEPHONE (OUTPATIENT)
Dept: FAMILY MEDICINE | Facility: CLINIC | Age: 82
End: 2017-09-20

## 2017-09-20 NOTE — TELEPHONE ENCOUNTER
Pls notify pt:    Overall, labs look good except for stable anemia so we'll see what hematology recommends  (Mg level is normal so current Mg dose is good)  UCx still not back and I'll let you know when that comes in

## 2017-09-21 LAB — BACTERIA UR CULT: NORMAL

## 2017-09-22 ENCOUNTER — TELEPHONE (OUTPATIENT)
Dept: FAMILY MEDICINE | Facility: CLINIC | Age: 82
End: 2017-09-22

## 2017-09-22 NOTE — TELEPHONE ENCOUNTER
Pt has referral to see hemoc for anemia.   Unable to gigi.  Please call pt to gigi with available provider.  Thanks.

## 2017-09-22 NOTE — TELEPHONE ENCOUNTER
Spoke to pt's daughter and she veralized understanding of lab result and hemoc appt to be sched.   [facility] to [facility] with next avail.

## 2017-09-22 NOTE — TELEPHONE ENCOUNTER
Pls notify pt:    Overall, labs look good except for the anemia  I suspect anemia is from kidney issues but let's see what the blood doc thinks

## 2017-09-26 ENCOUNTER — CLINICAL SUPPORT (OUTPATIENT)
Dept: FAMILY MEDICINE | Facility: CLINIC | Age: 82
End: 2017-09-26
Payer: MEDICARE

## 2017-09-26 ENCOUNTER — OFFICE VISIT (OUTPATIENT)
Dept: FAMILY MEDICINE | Facility: CLINIC | Age: 82
End: 2017-09-26
Payer: MEDICARE

## 2017-09-26 VITALS — DIASTOLIC BLOOD PRESSURE: 90 MMHG | OXYGEN SATURATION: 94 % | HEART RATE: 73 BPM | SYSTOLIC BLOOD PRESSURE: 220 MMHG

## 2017-09-26 DIAGNOSIS — R09.02 HYPOXIA: ICD-10-CM

## 2017-09-26 DIAGNOSIS — R06.03 RESPIRATORY DISTRESS: Primary | ICD-10-CM

## 2017-09-26 DIAGNOSIS — Z01.30 BP CHECK: Primary | ICD-10-CM

## 2017-09-26 PROBLEM — R06.02 SHORTNESS OF BREATH: Status: ACTIVE | Noted: 2017-09-26

## 2017-09-26 PROCEDURE — 1159F MED LIST DOCD IN RCRD: CPT | Mod: S$GLB,,, | Performed by: FAMILY MEDICINE

## 2017-09-26 PROCEDURE — 94640 AIRWAY INHALATION TREATMENT: CPT | Mod: S$GLB,,, | Performed by: FAMILY MEDICINE

## 2017-09-26 PROCEDURE — 99211 OFF/OP EST MAY X REQ PHY/QHP: CPT | Mod: S$GLB,,, | Performed by: FAMILY MEDICINE

## 2017-09-26 PROCEDURE — 1157F ADVNC CARE PLAN IN RCRD: CPT | Mod: S$GLB,,, | Performed by: FAMILY MEDICINE

## 2017-09-26 PROCEDURE — 99214 OFFICE O/P EST MOD 30 MIN: CPT | Mod: 25,S$GLB,, | Performed by: FAMILY MEDICINE

## 2017-09-26 PROCEDURE — 3008F BODY MASS INDEX DOCD: CPT | Mod: S$GLB,,, | Performed by: FAMILY MEDICINE

## 2017-09-26 RX ORDER — ALBUTEROL SULFATE 0.83 MG/ML
2.5 SOLUTION RESPIRATORY (INHALATION)
Status: CANCELLED | OUTPATIENT
Start: 2017-09-26 | End: 2017-09-26

## 2017-09-26 RX ORDER — ALBUTEROL SULFATE 0.83 MG/ML
2.5 SOLUTION RESPIRATORY (INHALATION)
Status: DISCONTINUED | OUTPATIENT
Start: 2017-09-26 | End: 2017-09-26 | Stop reason: HOSPADM

## 2017-09-26 RX ADMIN — ALBUTEROL SULFATE 2.5 MG: 0.83 SOLUTION RESPIRATORY (INHALATION) at 10:09

## 2017-09-26 NOTE — PROGRESS NOTES
Pt came in for BP check.   Pt was wheezing and coughing very badly and gagging on clear phlegm.  /90, pulse 78,   O2 94% with room air.    Provider informed, Dr Jung sent pt to hospital.  Pt picked up by ambulance.

## 2017-09-26 NOTE — PROGRESS NOTES
. Pt acme to clinic for BP check and was coughing, wheezing .  Pt was put on 2 liters of oxygen. Pt was given Albuterol via nebulizer. [facility] ambulance called to transport pt to [facility].-lp

## 2017-09-27 PROBLEM — Z95.5 STENTED CORONARY ARTERY: Status: ACTIVE | Noted: 2017-09-27

## 2017-09-30 VITALS
HEIGHT: 58 IN | RESPIRATION RATE: 36 BRPM | OXYGEN SATURATION: 98 % | DIASTOLIC BLOOD PRESSURE: 90 MMHG | HEART RATE: 73 BPM | SYSTOLIC BLOOD PRESSURE: 220 MMHG

## 2017-09-30 NOTE — PROGRESS NOTES
Subjective:       Patient ID: Kristen Nicole is a 89 y.o. female.    Chief Complaint: Cough (since last night); Shortness of Breath; and Wheezing    HPI   The patient is a 89-year-old who was originally here for a nurse visit to recheck her blood pressure following last week's visit but she was seen by me today because of a severe cough.  Her cough started around 12 midnight and since then she has had a persistent severe cough.  Her cough occasionally produces phlegm.  She is coughing so much that she is having a hard time breathing.  Because of her cough, she has been dry heaving and occasionally vomiting.  She has been wheezing.  Yesterday she was fine and had no symptoms at all.  She denies any chest pain.      Review of Systems   Constitutional: Negative for appetite change, chills, diaphoresis, fatigue, fever and unexpected weight change.   HENT: Negative for congestion, ear pain, postnasal drip, rhinorrhea, sinus pressure, sneezing, sore throat and trouble swallowing.    Eyes: Negative for pain, discharge and visual disturbance.   Respiratory: Positive for cough, chest tightness, shortness of breath and wheezing.    Cardiovascular: Negative for chest pain, palpitations and leg swelling.   Gastrointestinal: Negative for abdominal distention, abdominal pain, blood in stool, constipation, diarrhea, nausea and vomiting.   Skin: Negative for rash.       Objective:      Physical Exam   Constitutional: She is oriented to person, place, and time. She appears well-developed and well-nourished. She appears ill. She appears distressed.   She is in obvious respiratory distress, tachypnea and audibly wheezing.  She is coughing, dry heaving and vomiting.  Initially her oxygen was 94% but it would drop to 85% during coughing spells.  After the oxygen was started, her pulse ox went up to 98%   HENT:   Head: Normocephalic and atraumatic.   Right Ear: Tympanic membrane, external ear and ear canal normal. Decreased hearing  is noted.   Left Ear: Tympanic membrane, external ear and ear canal normal. Decreased hearing is noted.   Nose: Nose normal.   Mouth/Throat: Oropharynx is clear and moist and mucous membranes are normal. No oral lesions. No oropharyngeal exudate, posterior oropharyngeal edema or posterior oropharyngeal erythema.   Eyes: Conjunctivae, EOM and lids are normal. Pupils are equal, round, and reactive to light. No scleral icterus.   Neck: Normal range of motion. Neck supple. Carotid bruit is not present. No thyroid mass and no thyromegaly present.   Cardiovascular: Normal rate and regular rhythm.   No extrasystoles are present. PMI is not displaced.  Exam reveals no gallop.    Murmur heard.   Systolic murmur is present with a grade of 2/6   Pulmonary/Chest: Effort normal and breath sounds normal. No accessory muscle usage. No respiratory distress.   Decreased air movement with inspiratory and expiratory wheezing throughout especially anteriorly.  She was on 2 L of oxygen and did receive one unit dose of albuterol.  Symptomatically, she did improve but she did continue to have mild respiratory distress, tachypnea, persistent wheezing but improved air movement   Abdominal: Soft. Normal appearance and bowel sounds are normal. She exhibits no abdominal bruit. There is no hepatosplenomegaly. There is no tenderness. There is no rebound.   Lymphadenopathy:        Head (right side): No submental and no submandibular adenopathy present.        Head (left side): No submental and no submandibular adenopathy present.        Right cervical: No superficial cervical, no deep cervical and no posterior cervical adenopathy present.       Left cervical: No superficial cervical, no deep cervical and no posterior cervical adenopathy present.        Right: No supraclavicular adenopathy present.        Left: No supraclavicular adenopathy present.   Neurological: She is alert and oriented to person, place, and time.   Skin: Skin is warm, dry and  "intact.   Psychiatric: She has a normal mood and affect.     Blood pressure (!) 220/90, pulse 73, resp. rate (!) 36, height 4' 10" (1.473 m), SpO2 98 %.There is no height or weight on file to calculate BMI.        A/P:  1)  acute respiratory distress.  She was placed on oxygen and received 1 unit dose of albuterol which did seem to help.  She was transported to the ER via EMS.  A report was called to the ER  "

## 2017-10-01 PROBLEM — J96.01 ACUTE RESPIRATORY FAILURE WITH HYPOXIA: Status: ACTIVE | Noted: 2017-10-01

## 2017-10-03 ENCOUNTER — TELEPHONE (OUTPATIENT)
Dept: HEMATOLOGY/ONCOLOGY | Facility: CLINIC | Age: 82
End: 2017-10-03

## 2017-10-10 ENCOUNTER — LAB VISIT (OUTPATIENT)
Dept: LAB | Facility: HOSPITAL | Age: 82
End: 2017-10-10
Attending: INTERNAL MEDICINE
Payer: MEDICARE

## 2017-10-10 ENCOUNTER — OFFICE VISIT (OUTPATIENT)
Dept: HEMATOLOGY/ONCOLOGY | Facility: CLINIC | Age: 82
End: 2017-10-10
Payer: MEDICARE

## 2017-10-10 VITALS
DIASTOLIC BLOOD PRESSURE: 88 MMHG | HEART RATE: 65 BPM | SYSTOLIC BLOOD PRESSURE: 221 MMHG | BODY MASS INDEX: 28.33 KG/M2 | TEMPERATURE: 98 F | RESPIRATION RATE: 16 BRPM | WEIGHT: 134.94 LBS | HEIGHT: 58 IN

## 2017-10-10 DIAGNOSIS — D64.9 SEVERE ANEMIA: Primary | ICD-10-CM

## 2017-10-10 DIAGNOSIS — E53.8 B12 DEFICIENCY: ICD-10-CM

## 2017-10-10 DIAGNOSIS — D64.9 SEVERE ANEMIA: ICD-10-CM

## 2017-10-10 DIAGNOSIS — E11.22 TYPE 2 DIABETES MELLITUS WITH STAGE 4 CHRONIC KIDNEY DISEASE, WITHOUT LONG-TERM CURRENT USE OF INSULIN: ICD-10-CM

## 2017-10-10 DIAGNOSIS — D50.0 ANEMIA DUE TO CHRONIC BLOOD LOSS: ICD-10-CM

## 2017-10-10 DIAGNOSIS — N18.4 TYPE 2 DIABETES MELLITUS WITH STAGE 4 CHRONIC KIDNEY DISEASE, WITHOUT LONG-TERM CURRENT USE OF INSULIN: ICD-10-CM

## 2017-10-10 DIAGNOSIS — E78.5 HYPERLIPIDEMIA WITH TARGET LDL LESS THAN 70: ICD-10-CM

## 2017-10-10 DIAGNOSIS — D50.0 ANEMIA DUE TO CHRONIC BLOOD LOSS: Primary | ICD-10-CM

## 2017-10-10 DIAGNOSIS — N18.4 CHRONIC KIDNEY DISEASE (CKD) STAGE G4/A1, SEVERELY DECREASED GLOMERULAR FILTRATION RATE (GFR) BETWEEN 15-29 ML/MIN/1.73 SQUARE METER AND ALBUMINURIA CREATININE RATIO LESS THAN 30 MG/G: ICD-10-CM

## 2017-10-10 LAB
BASOPHILS # BLD AUTO: 0.05 K/UL
BASOPHILS NFR BLD: 0.6 %
BLD GP AB SCN CELLS X3 SERPL QL: NORMAL
DIFFERENTIAL METHOD: ABNORMAL
DIRECT COOMBS (POLY/IGG): NORMAL
EOSINOPHIL # BLD AUTO: 0.3 K/UL
EOSINOPHIL NFR BLD: 3.1 %
ERYTHROCYTE [DISTWIDTH] IN BLOOD BY AUTOMATED COUNT: 14.6 %
FERRITIN SERPL-MCNC: 44 NG/ML
FOLATE SERPL-MCNC: >20 NG/ML
HAPTOGLOB SERPL-MCNC: 126 MG/DL
HCT VFR BLD AUTO: 34.1 %
HGB BLD-MCNC: 11.1 G/DL
IRON SATN MFR SERPL: 28 %
IRON SERPL-MCNC: 88 UG/DL
LYMPHOCYTES # BLD AUTO: 2 K/UL
LYMPHOCYTES NFR BLD: 24.8 %
MCH RBC QN AUTO: 31.9 PG
MCHC RBC AUTO-ENTMCNC: 32.6 G/DL
MCV RBC AUTO: 98 FL
MONOCYTES # BLD AUTO: 0.6 K/UL
MONOCYTES NFR BLD: 7.7 %
NEUTROPHILS # BLD AUTO: 5.2 K/UL
NEUTROPHILS NFR BLD: 63.8 %
NRBC BLD-RTO: 0 /100 WBC
PATH REV BLD -IMP: NORMAL
PLATELET # BLD AUTO: 213 K/UL
PMV BLD AUTO: 10.5 FL
RBC # BLD AUTO: 3.48 M/UL
RETICS/RBC NFR AUTO: 1.7 %
TOTAL IRON BINDING CAPACITY: 314 UG/DL
VIT B12 SERPL-MCNC: 641 PG/ML
WBC # BLD AUTO: 8.09 K/UL

## 2017-10-10 PROCEDURE — 86880 COOMBS TEST DIRECT: CPT

## 2017-10-10 PROCEDURE — 99205 OFFICE O/P NEW HI 60 MIN: CPT | Mod: S$GLB,,, | Performed by: INTERNAL MEDICINE

## 2017-10-10 PROCEDURE — 85025 COMPLETE CBC W/AUTO DIFF WBC: CPT | Mod: PN

## 2017-10-10 PROCEDURE — 99999 PR PBB SHADOW E&M-EST. PATIENT-LVL III: CPT | Mod: PBBFAC,,, | Performed by: INTERNAL MEDICINE

## 2017-10-10 PROCEDURE — 83540 ASSAY OF IRON: CPT

## 2017-10-10 PROCEDURE — 84238 ASSAY NONENDOCRINE RECEPTOR: CPT

## 2017-10-10 PROCEDURE — 85045 AUTOMATED RETICULOCYTE COUNT: CPT

## 2017-10-10 PROCEDURE — 36415 COLL VENOUS BLD VENIPUNCTURE: CPT | Mod: PN

## 2017-10-10 PROCEDURE — 82607 VITAMIN B-12: CPT | Mod: PN

## 2017-10-10 PROCEDURE — 82746 ASSAY OF FOLIC ACID SERUM: CPT

## 2017-10-10 PROCEDURE — 82746 ASSAY OF FOLIC ACID SERUM: CPT | Mod: PN

## 2017-10-10 PROCEDURE — 86850 RBC ANTIBODY SCREEN: CPT | Mod: PN

## 2017-10-10 PROCEDURE — 82728 ASSAY OF FERRITIN: CPT

## 2017-10-10 PROCEDURE — 86880 COOMBS TEST DIRECT: CPT | Mod: PN

## 2017-10-10 PROCEDURE — 85045 AUTOMATED RETICULOCYTE COUNT: CPT | Mod: PN

## 2017-10-10 PROCEDURE — 86850 RBC ANTIBODY SCREEN: CPT

## 2017-10-10 PROCEDURE — 82728 ASSAY OF FERRITIN: CPT | Mod: PN

## 2017-10-10 PROCEDURE — 84165 PROTEIN E-PHORESIS SERUM: CPT

## 2017-10-10 PROCEDURE — 85025 COMPLETE CBC W/AUTO DIFF WBC: CPT

## 2017-10-10 PROCEDURE — 99499 UNLISTED E&M SERVICE: CPT | Mod: S$GLB,,, | Performed by: INTERNAL MEDICINE

## 2017-10-10 PROCEDURE — 82668 ASSAY OF ERYTHROPOIETIN: CPT

## 2017-10-10 PROCEDURE — 83010 ASSAY OF HAPTOGLOBIN QUANT: CPT

## 2017-10-10 PROCEDURE — 82607 VITAMIN B-12: CPT

## 2017-10-10 PROCEDURE — 83540 ASSAY OF IRON: CPT | Mod: PN

## 2017-10-10 PROCEDURE — 84165 PROTEIN E-PHORESIS SERUM: CPT | Mod: 26,,, | Performed by: PATHOLOGY

## 2017-10-10 PROCEDURE — 86334 IMMUNOFIX E-PHORESIS SERUM: CPT | Mod: 26,,, | Performed by: PATHOLOGY

## 2017-10-10 PROCEDURE — 86334 IMMUNOFIX E-PHORESIS SERUM: CPT

## 2017-10-10 NOTE — LETTER
October 10, 2017      Codi Jung MD  [id number] [address] [address]           [place] [id number] [facility] [address]  [facility] [address]  Phone: [phone]  Fax: [phone]          Patient: Kristen Nicole   MR Number: [id number]   YOB: 1923   Date of Visit: 10/10/2017       Dear Dr. Codi Jung:    Thank you for referring Kristen Nicole to me for evaluation. Attached you will find relevant portions of my assessment and plan of care.    If you have questions, please do not hesitate to call me. I look forward to following Kristen Nicole along with you.    Sincerely,    Krystina Rodriguez MD    Enclosure  CC:  No Recipients    If you would like to receive this communication electronically, please contact [email or web address] or [phone] to request more information on [employer] access.    For providers and/or their staff who would like to refer a patient to [facility], please contact us through our one-stop-shop provider referral line, [facility], at [phone].    If you feel you have received this communication in error or would no longer like to receive these types of communications, please e-mail [email or web address]

## 2017-10-10 NOTE — PROGRESS NOTES
Subjective:       Patient ID: Kristen Nicole is a 89 y.o. female.    Chief Complaint: here for anemia  HPI:     Social History     Social History    Marital status:      Spouse name: N/A    Number of children: 5    Years of education: N/A     Occupational History    nurse       Office     Social History Main Topics    Smoking status: Never Smoker    Smokeless tobacco: Never Used    Alcohol use No    Drug use: No    Sexual activity: Not Currently     Other Topics Concern    None     Social History Narrative    None     Family History   Problem Relation Age of Onset    Brain cancer Mother     Heart disease Mother     Pancreatic cancer Father     Cancer Brother     Pancreatic cancer Son     Heart disease Sister     Heart disease Sister     Heart disease Sister     Hypertension Sister     Diabetes Sister     Cancer Sister      Breast     Past Surgical History:   Procedure Laterality Date    ANTERIOR CERVICAL DISCECTOMY W/ FUSION      Cervical Fusion    APPENDECTOMY      CATARACT EXTRACTION Bilateral     ORIF TIBIA & FIBULA FRACTURES      screws and wires in ankle    RETINAL DETACHMENT SURGERY Right     TONSILLECTOMY, ADENOIDECTOMY       Past Medical History:   Diagnosis Date    Arthritis     Asthma     CAD (coronary artery disease)     s/p LAD stent;  last cath 6/16 with 30-40% blockages    Carotid arterial disease     noted on USG 12/15    CKD (chronic kidney disease) stage 3, GFR 30-59 ml/min     Diabetes mellitus, type 2     Diastolic dysfunction     grade 2 on 4/17 ECHO    GERD (gastroesophageal reflux disease)     H/O colonoscopy     5/08    Hiatal hernia     noted on EGD    History of gout     History of SCC (squamous cell carcinoma) of skin     Hyperlipidemia     Hypertension     Ischemic cardiomyopathy     EF 55% on 4/17 ECHO but was 25% on 12/16 ECHO    Mitral valve regurgitation     mild-moderate on 4/17 ECHO    Occipital neuralgia     Osteoporosis      noted on 11/14    Pulmonary hypertension     noted on 4/17 ECHO       Current Outpatient Prescriptions:     allopurinol (ZYLOPRIM) 100 MG tablet, Take 1 tablet (100 mg total) by mouth once daily., Disp: 90 tablet, Rfl: 1    alprazolam (XANAX) 0.25 MG tablet, Take 1 tablet (0.25 mg total) by mouth daily as needed for Anxiety., Disp: 30 tablet, Rfl: 0    aspirin (ECOTRIN) 81 MG EC tablet, Take 81 mg by mouth once daily., Disp: , Rfl:     ATROVENT HFA 17 mcg/actuation inhaler, INHALE 2 PUFFS INTO THE LUNGS EVERY 6 (SIX) HOURS., Disp: 4 Inhaler, Rfl: 3    blood sugar diagnostic Strp, 1 strip by Misc.(Non-Drug; Combo Route) route once daily. Accu-check Plus Test Strips Drum, Disp: 100 each, Rfl: 11    blood sugar diagnostic Strp, For daily and prn checks, Disp: 45 strip, Rfl: prn    blood-glucose meter (BLOOD GLUCOSE MONITORING) kit, Use as instructed, Disp: 1 each, Rfl: 0    calcium-vitamin D3 (CALCIUM 500 + D) 500 mg(1,250mg) -200 unit per tablet, Take 1 tablet by mouth 2 (two) times daily with meals., Disp: , Rfl:     carbamazepine (TEGRETOL XR) 100 MG 12 hr tablet, Take 100 mg by mouth 2 (two) times daily., Disp: , Rfl:     clopidogrel (PLAVIX) 75 mg tablet, TAKE 1 TABLET ONE TIME DAILY, Disp: 90 tablet, Rfl: 0    cranberry 400 mg Cap, Take 500 mg by mouth once daily. , Disp: , Rfl:     felodipine (PLENDIL) 5 MG 24 hr tablet, Take 1 tablet (5 mg total) by mouth every evening., Disp: 90 tablet, Rfl: 3    ferrous sulfate 325 mg (65 mg iron) Tab tablet, Take 1 tablet (325 mg total) by mouth once daily. Try on empty stomach first, can take with food if GI upset. (Patient taking differently: Take 325 mg by mouth every other day. Try on empty stomach first, can take with food if GI upset.), Disp: 90 tablet, Rfl: 3    fluticasone (FLONASE) 50 mcg/actuation nasal spray, 2 sprays by Each Nare route once daily. (Patient taking differently: 2 sprays by Each Nare route daily as needed. ), Disp: 16 g, Rfl: 11     furosemide (LASIX) 10 mg/mL (alcohol free) solution, Take 1 mL (10 mg total) by mouth once daily., Disp: 120 mL, Rfl: 3    GINGER ROOT, BULK, MISC, 550 mg by Misc.(Non-Drug; Combo Route) route once daily., Disp: , Rfl:     hydrALAZINE (APRESOLINE) 25 MG tablet, Take 1 tablet (25 mg total) by mouth every 8 (eight) hours., Disp: 90 tablet, Rfl: 0    ipratropium (ATROVENT) 0.02 % nebulizer solution, Take 500 mcg by nebulization 4 (four) times daily. Rescue, Disp: , Rfl:     isosorbide mononitrate (IMDUR) 60 MG 24 hr tablet, Take 1 tablet (60 mg total) by mouth every evening., Disp: 90 tablet, Rfl: 3    labetalol (NORMODYNE) 200 MG tablet, Take 1 tablet (200 mg total) by mouth every 12 (twelve) hours., Disp: 180 tablet, Rfl: 3    magnesium oxide (MAG-OX) 400 mg tablet, Take 500 mg by mouth once daily. , Disp: , Rfl:     multivitamin (ONE DAILY MULTIVITAMIN) per tablet, Take 1 tablet by mouth once daily., Disp: , Rfl:     nebulizer and compressor (PORTABLE NEBULIZER SYSTEM) Jeana, Pt needs portable battery operated nebulizer., Disp: 1 each, Rfl: 0    nitroGLYCERIN (NITROSTAT) 0.4 MG SL tablet, Place 1 tablet (0.4 mg total) under the tongue every 5 (five) minutes as needed for Chest pain., Disp: 25 tablet, Rfl: 3    omega-3 acid ethyl esters (LOVAZA) 1 gram capsule, Take 1 g by mouth 2 (two) times daily., Disp: , Rfl:     omeprazole (PRILOSEC) 20 MG capsule, Take 1 capsule (20 mg total) by mouth once daily. (Patient taking differently: Take 20 mg by mouth every other day. ), Disp: 90 capsule, Rfl: 3    simvastatin (ZOCOR) 20 MG tablet, Take 1 tablet (20 mg total) by mouth every evening., Disp: 90 tablet, Rfl: 3    tolterodine (DETROL LA) 4 MG 24 hr capsule, TAKE 1 CAPSULE ONE TIME DAILY, Disp: 90 capsule, Rfl: 0  Review of patient's allergies indicates:   Allergen Reactions    Albuterol Shortness Of Breath    Influenza a (h1n1) vac 09 (pf)      Other reaction(s): Angioedema    Nortriptyline      Causes  headaches    Pneumococcal vaccine     Prevnar 13 [pneumoc 13-abram conj-dip cr(pf)] Edema         REVIEW OF SYSTEMS:     CONSTITUTIONAL: The patient denies any weight change. There is no apparent    change in appetite, fever, night sweats, headaches, fatigue, dizziness, or    weakness.      SKIN: Denies rash, issues with nails, non-healing sores, bleeding, blotching    skin or abnormal bruising. Denies new moles or changes to existing moles.      BREASTS: There is no swelling around breasts or nipple discharge.    EYES: Denies eye pain, blurred vision, swelling, redness or discharge.      ENT AND MOUTH: Denies runny nose, stuffiness, sinus trouble or sores. Denies    nosebleeds. Denies, hoarseness, change in voice or swelling in front of the    neck.      CARDIOVASCULAR: Denies chest pain, discomfort or palpitations. Denies neck    swelling or episodes of passing out.      RESPIRATORY: Denies cough, sputum production, blood in sputum, and denies    shortness of breath.      GI: Denies trouble swallowing, indigestion, heartburn, abdominal pain, nausea,    vomiting, diarrhea, altered bowel habits, blood in stool, discoloration of    stools, change in nature of stool, bloating, increased abdominal girth.      GENITOURINARY: No discharge. No pelvic pain or lumps. No rash around groin or  lesions. No urinary frequency, hesitation, painful urination or blood in    urine. Denies incontinence. No problems with intercourse.      MUSCULOSKELETAL: Denies neck or back pain. Denies weakness in arms or legs,    joint problems or distended inflamed veins in legs. Denies swelling or abnormal  glands.      NEUROLOGICAL: Denies tingling, numbness, altered mentation changes to nerve    function in the face, weakness to one or both of the body. Denies changes to    gait and denies multiple falls or accidents.      PHYSICAL EXAM:     Vitals:    10/10/17 0941   BP: (!) 221/88   Pulse: 65   Resp: 16   Temp: 97.9 °F (36.6 °C)        GENERAL: Comfortable looking patient. Patient is in no distress.  Awake, alert and oriented to time, person and place.  No anxiety, or agitation.      HEENT: Normal conjunctivae and eyelids. WNL.  PERRLA 3 to 4 mm. No icterus, no pallor, no congestion, and no discharge noted.     NECK:  Supple. Trachea is central.  No crepitus.  No JVD or masses.    RESPIRATORY:  No intercostal retractions.  No dullness to percussion.  Chest is clear to auscultation.  No rales, rhonchi or wheezes.  No crepitus.  Good air entry bilaterally.    CARDIOVASCULAR:  S1 and S2 are normally heard without murmurs or gallops.  All peripheral pulses are present.    ABDOMEN:  Normal abdomen.  No hepatosplenomegaly.  No free fluid.  Bowel sounds are present.  No hernia noted. No masses.  No rebound or tenderness.  No guarding or rigidity.  Umbilicus is midline.    LYMPHATICS:  No axillary, cervical, supraclavicular, submental, or inguinal lymphadenopathy.    SKIN/MUSCULOSKELETAL:  There is no evidence of excoriation marks or ecchmosis.  No rashes.  No cyanosis.  No clubbing.  No joint or skeletal deformities noted.  Normal range of motion.    NEUROLOGIC:  Higher functions are appropriate.  No cranial nerve deficits.  Normal ken.  Normal strength.  Motor and sensory functions are normal.  Deep tendon reflexes are normal.    GENITAL/RECTAL:  Exams are deferred.      Laboratory:     CBC:  Lab Results   Component Value Date    WBC 8.09 10/10/2017    RBC 3.48 (L) 10/10/2017    HGB 11.1 (L) 10/10/2017    HCT 34.1 (L) 10/10/2017    MCV 98 10/10/2017    MCH 31.9 (H) 10/10/2017    MCHC 32.6 10/10/2017    RDW 14.6 (H) 10/10/2017     10/10/2017    MPV 10.5 10/10/2017    GRAN 5.2 10/10/2017    GRAN 63.8 10/10/2017    LYMPH 2.0 10/10/2017    LYMPH 24.8 10/10/2017    MONO 0.6 10/10/2017    MONO 7.7 10/10/2017    EOS 0.3 10/10/2017    BASO 0.05 10/10/2017    EOSINOPHIL 3.1 10/10/2017    BASOPHIL 0.6 10/10/2017       BMP: BMP  Lab Results    Component Value Date     09/29/2017    K 4.2 09/29/2017     09/29/2017    CO2 26 09/29/2017    BUN 52 (H) 09/29/2017    CREATININE 1.62 (H) 09/29/2017    CALCIUM 8.8 09/29/2017    ANIONGAP 6 (L) 09/29/2017    ESTGFRAFRICA 31 (A) 09/29/2017    EGFRNONAA 27 (A) 09/29/2017       LFT:   Lab Results   Component Value Date    ALT 19 09/26/2017    AST 45 (H) 09/26/2017    ALKPHOS 66 09/26/2017    BILITOT 1.3 09/26/2017         Assessment/Plan:       1. Severe anemia    2. B12 deficiency    3. Chronic kidney disease (CKD) stage G4/A1, severely decreased glomerular filtration rate (GFR) between 15-29 mL/min/1.73 square meter and albuminuria creatinine ratio less than 30 mg/g    4. Type 2 diabetes mellitus with stage 4 chronic kidney disease, without long-term current use of insulin    5. Hyperlipidemia with target LDL less than 70        Will check for path review, hemolysis, spep, haptoglobin, retic count, iron b12 and bring pt back to clinic to discuss

## 2017-10-11 ENCOUNTER — TELEPHONE (OUTPATIENT)
Dept: HEMATOLOGY/ONCOLOGY | Facility: CLINIC | Age: 82
End: 2017-10-11

## 2017-10-11 ENCOUNTER — PATIENT OUTREACH (OUTPATIENT)
Dept: ADMINISTRATIVE | Facility: HOSPITAL | Age: 82
End: 2017-10-11

## 2017-10-11 LAB
ALBUMIN SERPL ELPH-MCNC: 4.17 G/DL
ALPHA1 GLOB SERPL ELPH-MCNC: 0.36 G/DL
ALPHA2 GLOB SERPL ELPH-MCNC: 0.86 G/DL
B-GLOBULIN SERPL ELPH-MCNC: 0.73 G/DL
GAMMA GLOB SERPL ELPH-MCNC: 0.99 G/DL
INTERPRETATION SERPL IFE-IMP: NORMAL
PATH REV BLD -IMP: NORMAL
PATHOLOGIST INTERPRETATION IFE: NORMAL
PATHOLOGIST INTERPRETATION SPE: NORMAL
PROT SERPL-MCNC: 7.1 G/DL

## 2017-10-11 NOTE — TELEPHONE ENCOUNTER
----- Message from Luly Kelley sent at 10/11/2017 12:51 PM CDT -----  Contact: Daughter  Destiny, daughter [phone]. Requesting to reschedule appointment on 10/26/17. Please advise. Thanks.

## 2017-10-12 ENCOUNTER — TELEPHONE (OUTPATIENT)
Dept: HEMATOLOGY/ONCOLOGY | Facility: CLINIC | Age: 82
End: 2017-10-12

## 2017-10-12 NOTE — TELEPHONE ENCOUNTER
----- Message from Alyce Lindsay sent at 10/12/2017  9:21 AM CDT -----  Contact: Daughter Destiny [phone]  Call placed to pod. Patient returned your call, please call back.  Thank you!

## 2017-10-13 LAB
ERYTHROPOIETIN: 7.5 MIU/ML
STFR SERPL-MCNC: 3 MG/L

## 2017-10-13 NOTE — TELEPHONE ENCOUNTER
I called and left v/m I needed to talk to Destiny in ref to her call about rescheduling an appt. Please rtc to Radha at [phone]. cm

## 2017-10-16 ENCOUNTER — LAB VISIT (OUTPATIENT)
Dept: LAB | Facility: HOSPITAL | Age: 82
End: 2017-10-16
Attending: FAMILY MEDICINE
Payer: MEDICARE

## 2017-10-16 DIAGNOSIS — D64.9 ANEMIA, UNSPECIFIED TYPE: ICD-10-CM

## 2017-10-16 PROCEDURE — 82270 OCCULT BLOOD FECES: CPT | Mod: 91

## 2017-10-16 NOTE — TELEPHONE ENCOUNTER
I called and spoke with Ms Peterson, she asked me to rtc in am , her daughter is out and will not be back till after 6 pm tonight. I agreed. cm

## 2017-10-17 ENCOUNTER — TELEPHONE (OUTPATIENT)
Dept: FAMILY MEDICINE | Facility: CLINIC | Age: 82
End: 2017-10-17

## 2017-10-17 LAB
OB PNL STL: NEGATIVE

## 2017-10-17 NOTE — TELEPHONE ENCOUNTER
Pls notify pt:    Stool cards are normal  Be sure to fu with Dr Rodriguez regarding the anemia but no evidence of blood loss in the colon

## 2017-10-23 ENCOUNTER — TELEPHONE (OUTPATIENT)
Dept: HEMATOLOGY/ONCOLOGY | Facility: CLINIC | Age: 82
End: 2017-10-23

## 2017-10-23 ENCOUNTER — OFFICE VISIT (OUTPATIENT)
Dept: CARDIOLOGY | Facility: CLINIC | Age: 82
End: 2017-10-23
Payer: MEDICARE

## 2017-10-23 VITALS
HEART RATE: 71 BPM | SYSTOLIC BLOOD PRESSURE: 154 MMHG | HEIGHT: 58 IN | BODY MASS INDEX: 29.76 KG/M2 | WEIGHT: 141.75 LBS | DIASTOLIC BLOOD PRESSURE: 67 MMHG

## 2017-10-23 DIAGNOSIS — I25.5 ISCHEMIC CARDIOMYOPATHY: ICD-10-CM

## 2017-10-23 DIAGNOSIS — I10 ESSENTIAL HYPERTENSION: ICD-10-CM

## 2017-10-23 DIAGNOSIS — E78.5 HYPERLIPIDEMIA WITH TARGET LDL LESS THAN 70: ICD-10-CM

## 2017-10-23 DIAGNOSIS — I44.7 LEFT BUNDLE BRANCH BLOCK: ICD-10-CM

## 2017-10-23 DIAGNOSIS — I51.9 SYSTOLIC DYSFUNCTION: Primary | ICD-10-CM

## 2017-10-23 DIAGNOSIS — I34.0 MITRAL VALVE INSUFFICIENCY, UNSPECIFIED ETIOLOGY: ICD-10-CM

## 2017-10-23 PROCEDURE — 99499 UNLISTED E&M SERVICE: CPT | Mod: S$GLB,,, | Performed by: INTERNAL MEDICINE

## 2017-10-23 PROCEDURE — 99999 PR PBB SHADOW E&M-EST. PATIENT-LVL II: CPT | Mod: PBBFAC,,, | Performed by: INTERNAL MEDICINE

## 2017-10-23 PROCEDURE — 99214 OFFICE O/P EST MOD 30 MIN: CPT | Mod: S$GLB,,, | Performed by: INTERNAL MEDICINE

## 2017-10-23 RX ORDER — HYDRALAZINE HYDROCHLORIDE 25 MG/1
25 TABLET, FILM COATED ORAL EVERY 8 HOURS
Qty: 270 TABLET | Refills: 3 | Status: ON HOLD | OUTPATIENT
Start: 2017-10-23 | End: 2018-01-01

## 2017-10-23 NOTE — TELEPHONE ENCOUNTER
Called patient's daughter to reschedule call was disconnected. Called 2nd number to reschedule appointment

## 2017-10-23 NOTE — TELEPHONE ENCOUNTER
----- Message from Anabella Moffett sent at 10/23/2017  9:30 AM CDT -----  Daughter requesting to cancel patient's appointment on October 26th/needs to reschedule/unable to reach anyone by phone/stated her phone is out of order/will call back to reschedule.

## 2017-10-23 NOTE — PROGRESS NOTES
Subjective:    Patient ID:  Kristen Nicole is a 89 y.o. female who presents for follow-up of ICM    HPI  She comes with no complaints, no chest pain, no shortness of breath  FC II    Review of Systems   Constitution: Negative for decreased appetite, weakness, malaise/fatigue, weight gain and weight loss.   Cardiovascular: Negative for chest pain, dyspnea on exertion, leg swelling, palpitations and syncope.   Respiratory: Negative for cough and shortness of breath.    Gastrointestinal: Negative.    All other systems reviewed and are negative.       Objective:    Physical Exam   Constitutional: She is oriented to person, place, and time. She appears well-developed and well-nourished.   HENT:   Head: Normocephalic.   Eyes: Pupils are equal, round, and reactive to light.   Neck: Normal range of motion. Neck supple. No JVD present. Carotid bruit is not present. No thyromegaly present.   Cardiovascular: Normal rate, regular rhythm, normal heart sounds, intact distal pulses and normal pulses.  PMI is not displaced.  Exam reveals no gallop.    No murmur heard.  Pulmonary/Chest: Effort normal and breath sounds normal.   Abdominal: Soft. Normal appearance. She exhibits no mass. There is no hepatosplenomegaly. There is no tenderness.   Musculoskeletal: Normal range of motion. She exhibits no edema.   Neurological: She is alert and oriented to person, place, and time. She has normal strength and normal reflexes. No sensory deficit.   Skin: Skin is warm and intact.   Psychiatric: She has a normal mood and affect.   Nursing note and vitals reviewed.        Assessment:       1. Systolic dysfunction, LVEF 41%    2. Left bundle branch block    3. Hyperlipidemia with target LDL less than 70    4. Mitral valve insufficiency, unspecified etiology    5. Essential hypertension    6. Ischemic cardiomyopathy         Plan:     Continue all cardiac medications  Regular exercise program  9 m f/u

## 2017-10-25 ENCOUNTER — OFFICE VISIT (OUTPATIENT)
Dept: FAMILY MEDICINE | Facility: CLINIC | Age: 82
End: 2017-10-25
Payer: MEDICARE

## 2017-10-25 VITALS
WEIGHT: 138.19 LBS | OXYGEN SATURATION: 98 % | BODY MASS INDEX: 29.01 KG/M2 | HEIGHT: 58 IN | DIASTOLIC BLOOD PRESSURE: 82 MMHG | RESPIRATION RATE: 16 BRPM | SYSTOLIC BLOOD PRESSURE: 148 MMHG | HEART RATE: 65 BPM | TEMPERATURE: 98 F

## 2017-10-25 DIAGNOSIS — F32.A DEPRESSION, UNSPECIFIED DEPRESSION TYPE: Primary | ICD-10-CM

## 2017-10-25 DIAGNOSIS — N18.4 CHRONIC KIDNEY DISEASE (CKD) STAGE G4/A1, SEVERELY DECREASED GLOMERULAR FILTRATION RATE (GFR) BETWEEN 15-29 ML/MIN/1.73 SQUARE METER AND ALBUMINURIA CREATININE RATIO LESS THAN 30 MG/G: ICD-10-CM

## 2017-10-25 DIAGNOSIS — J44.9 CHRONIC OBSTRUCTIVE PULMONARY DISEASE, UNSPECIFIED COPD TYPE: ICD-10-CM

## 2017-10-25 PROCEDURE — 99499 UNLISTED E&M SERVICE: CPT | Mod: S$GLB,,, | Performed by: FAMILY MEDICINE

## 2017-10-25 PROCEDURE — 99214 OFFICE O/P EST MOD 30 MIN: CPT | Mod: S$GLB,,, | Performed by: FAMILY MEDICINE

## 2017-10-25 RX ORDER — LABETALOL 200 MG/1
200 TABLET, FILM COATED ORAL EVERY 12 HOURS
Qty: 180 TABLET | Refills: 0 | Status: ON HOLD | OUTPATIENT
Start: 2017-10-25 | End: 2017-12-01

## 2017-10-25 RX ORDER — OMEPRAZOLE 20 MG/1
20 CAPSULE, DELAYED RELEASE ORAL DAILY
COMMUNITY
End: 2018-01-01 | Stop reason: SDUPTHER

## 2017-10-25 RX ORDER — LABETALOL 200 MG/1
200 TABLET, FILM COATED ORAL EVERY 12 HOURS
Qty: 180 TABLET | Refills: 0 | Status: SHIPPED | OUTPATIENT
Start: 2017-10-25 | End: 2017-10-25 | Stop reason: SDUPTHER

## 2017-10-25 RX ORDER — DULOXETIN HYDROCHLORIDE 30 MG/1
30 CAPSULE, DELAYED RELEASE ORAL DAILY
Qty: 30 CAPSULE | Refills: 1 | Status: SHIPPED | OUTPATIENT
Start: 2017-10-25 | End: 2017-11-02 | Stop reason: SINTOL

## 2017-10-25 RX ORDER — FERROUS SULFATE 325(65) MG
325 TABLET ORAL EVERY OTHER DAY
COMMUNITY
End: 2018-01-01

## 2017-10-26 ENCOUNTER — OUTPATIENT CASE MANAGEMENT (OUTPATIENT)
Dept: ADMINISTRATIVE | Facility: OTHER | Age: 82
End: 2017-10-26

## 2017-10-26 NOTE — PROGRESS NOTES
The following patient has been assigned to Becky Tan RN with [facility] for high risk screening.    Reason: High Risk    Please contact [facility] at [phone] with any questions.    Thank you,  Sara Zarco

## 2017-10-29 NOTE — PROGRESS NOTES
Subjective:       Patient ID: Kristen Nicole is a 89 y.o. female.    Chief Complaint: Follow-up (admitted to [facility] [place] [facility] 4 weeks ago ); Cough; and Wheezing (off and on )    HPI   The patient is a 89-year-old who is here today for follow-up.  At her last visit, she was sent to the ER by EMS due to respiratory distress.  She was admitted for 3 days.  She did have a cardiac evaluation that revealed no ischemia.  She was treated for an acute COPD exacerbation.  Since being home, she has not had any significant issue with her lungs.  She does have a portable nebulizer which has been helpful.  She is using Atrovent regularly by neb or MDI.  She tells me that she has seen the pulmonologist since her hospital stay but doesn't remember what was done at that visit    Today, she tells me that she has no energy and that she hurts all over.  She becomes very tearful.  She feels as if she is a burden to her daughter.  She admits that she feels depressed at times.  She does still have some good days.  We did discuss medication.  Her daughter was initially against her considering medication because of a reaction her  had but as we talked about this further, the daughter encourage the patient to do what she wanted to do.  Ultimately the patient wanted to consider a medication to see if this would help      Review of Systems   Constitutional: Positive for fatigue. Negative for appetite change, chills, diaphoresis, fever and unexpected weight change.   HENT: Positive for hearing loss. Negative for congestion, ear pain, postnasal drip, rhinorrhea, sinus pressure, sneezing, sore throat and trouble swallowing.    Eyes: Negative for pain, discharge and visual disturbance.   Respiratory: Negative for cough, chest tightness, shortness of breath and wheezing.    Cardiovascular: Negative for chest pain, palpitations and leg swelling.   Gastrointestinal: Negative for abdominal distention, abdominal pain, blood in stool,  constipation, diarrhea, nausea and vomiting.   Genitourinary:        Per HPI   Musculoskeletal: Positive for arthralgias, back pain, myalgias and neck pain.   Skin: Negative for rash.       Objective:      Physical Exam   Constitutional: She is oriented to person, place, and time. She appears well-developed and well-nourished. No distress.   She is tearful throughout our visit today   HENT:   Head: Normocephalic and atraumatic.   Right Ear: Tympanic membrane, external ear and ear canal normal. Decreased hearing is noted.   Left Ear: Tympanic membrane, external ear and ear canal normal. Decreased hearing is noted.   Nose: Nose normal.   Mouth/Throat: Oropharynx is clear and moist and mucous membranes are normal. No oral lesions. No oropharyngeal exudate, posterior oropharyngeal edema or posterior oropharyngeal erythema.   Eyes: Conjunctivae, EOM and lids are normal. Pupils are equal, round, and reactive to light. No scleral icterus.   Neck: Normal range of motion. Neck supple. Carotid bruit is not present. No thyroid mass and no thyromegaly present.   Cardiovascular: Normal rate, regular rhythm and normal heart sounds.   No extrasystoles are present. PMI is not displaced.  Exam reveals no gallop.    No murmur heard.  Pulmonary/Chest: Effort normal and breath sounds normal. No accessory muscle usage. No respiratory distress.   Clear to auscultation bilaterally.   Abdominal: Soft. Normal appearance and bowel sounds are normal. She exhibits no abdominal bruit. There is no hepatosplenomegaly. There is no tenderness. There is no rebound.   Lymphadenopathy:        Head (right side): No submental and no submandibular adenopathy present.        Head (left side): No submental and no submandibular adenopathy present.        Right cervical: No superficial cervical, no deep cervical and no posterior cervical adenopathy present.       Left cervical: No superficial cervical, no deep cervical and no posterior cervical adenopathy  "present.        Right: No supraclavicular adenopathy present.        Left: No supraclavicular adenopathy present.   Neurological: She is alert and oriented to person, place, and time.   Skin: Skin is warm, dry and intact.   Psychiatric: She exhibits a depressed mood.     Blood pressure (!) 148/82, pulse 65, temperature 97.9 °F (36.6 °C), temperature source Oral, resp. rate 16, height 4' 10" (1.473 m), weight 62.7 kg (138 lb 3.2 oz), SpO2 98 %.Body mass index is 28.88 kg/m².          A/P:  1)  recent episode of respiratory distress with history of asthma and unclear history of COPD.  Currently asymptomatic.  She will continue with her Atrovent.  We will request her pulmonary records.  I would like clarification of her underlying diagnosis with PFTs and a better understanding of her disease management plans  2)  depression.  New to me.  We will try Cymbalta.  We can adjust this dose further if needed.  If she has any trouble tolerating the Cymbalta, she will let me know.  We will see how she does with this in 6 weeks   3)  fatigue.  Likely multifactorial.  We will see how she does use her depression improves.  Recent labs have in stable  4)  diffuse arthralgias and myalgias with underlying arthritis and possible fibromyalgia.  Again, we will see how she does with the Cymbalta.  We will reevaluate this at her next visit  "

## 2017-11-02 ENCOUNTER — OFFICE VISIT (OUTPATIENT)
Dept: HEMATOLOGY/ONCOLOGY | Facility: CLINIC | Age: 82
End: 2017-11-02
Payer: MEDICARE

## 2017-11-02 VITALS
HEART RATE: 65 BPM | RESPIRATION RATE: 20 BRPM | HEIGHT: 58 IN | DIASTOLIC BLOOD PRESSURE: 65 MMHG | TEMPERATURE: 98 F | WEIGHT: 128.06 LBS | BODY MASS INDEX: 26.88 KG/M2 | SYSTOLIC BLOOD PRESSURE: 148 MMHG

## 2017-11-02 DIAGNOSIS — E11.22 TYPE 2 DIABETES MELLITUS WITH STAGE 4 CHRONIC KIDNEY DISEASE, WITHOUT LONG-TERM CURRENT USE OF INSULIN: ICD-10-CM

## 2017-11-02 DIAGNOSIS — E78.5 HYPERLIPIDEMIA WITH TARGET LDL LESS THAN 70: ICD-10-CM

## 2017-11-02 DIAGNOSIS — N18.30 ANEMIA DUE TO STAGE 3 (MODERATE) CHRONIC RENAL FAILURE TREATED WITH ERYTHROPOIETIN: Primary | ICD-10-CM

## 2017-11-02 DIAGNOSIS — N18.4 CHRONIC KIDNEY DISEASE (CKD) STAGE G4/A1, SEVERELY DECREASED GLOMERULAR FILTRATION RATE (GFR) BETWEEN 15-29 ML/MIN/1.73 SQUARE METER AND ALBUMINURIA CREATININE RATIO LESS THAN 30 MG/G: ICD-10-CM

## 2017-11-02 DIAGNOSIS — D63.1 ANEMIA DUE TO STAGE 3 (MODERATE) CHRONIC RENAL FAILURE TREATED WITH ERYTHROPOIETIN: Primary | ICD-10-CM

## 2017-11-02 DIAGNOSIS — N18.4 TYPE 2 DIABETES MELLITUS WITH STAGE 4 CHRONIC KIDNEY DISEASE, WITHOUT LONG-TERM CURRENT USE OF INSULIN: ICD-10-CM

## 2017-11-02 PROCEDURE — 99499 UNLISTED E&M SERVICE: CPT | Mod: S$GLB,,, | Performed by: INTERNAL MEDICINE

## 2017-11-02 PROCEDURE — 99999 PR PBB SHADOW E&M-EST. PATIENT-LVL III: CPT | Mod: PBBFAC,,, | Performed by: INTERNAL MEDICINE

## 2017-11-02 PROCEDURE — 99214 OFFICE O/P EST MOD 30 MIN: CPT | Mod: S$GLB,,, | Performed by: INTERNAL MEDICINE

## 2017-11-02 NOTE — PROGRESS NOTES
Subjective:       Patient ID: Kristen Nicole is a 89 y.o. female.    Chief Complaint: here for anemia after w/u done to discuss results with daughter has renal appt in 1 week with labs as well  HPI:     Social History     Social History    Marital status:      Spouse name: N/A    Number of children: 5    Years of education: N/A     Occupational History    nurse       Office     Social History Main Topics    Smoking status: Never Smoker    Smokeless tobacco: Never Used    Alcohol use No    Drug use: No    Sexual activity: Not Currently     Other Topics Concern    Not on file     Social History Narrative    No narrative on file     Family History   Problem Relation Age of Onset    Brain cancer Mother     Heart disease Mother     Pancreatic cancer Father     Cancer Brother     Pancreatic cancer Son     Heart disease Sister     Heart disease Sister     Heart disease Sister     Hypertension Sister     Diabetes Sister     Cancer Sister      Breast     Past Surgical History:   Procedure Laterality Date    ANTERIOR CERVICAL DISCECTOMY W/ FUSION      Cervical Fusion    APPENDECTOMY      CATARACT EXTRACTION Bilateral     ORIF TIBIA & FIBULA FRACTURES      screws and wires in ankle    RETINAL DETACHMENT SURGERY Right     TONSILLECTOMY, ADENOIDECTOMY       Past Medical History:   Diagnosis Date    Arthritis     Asthma     CAD (coronary artery disease)     s/p LAD stent;  last cath 6/16 with 30-40% blockages    Carotid arterial disease     noted on USG 12/15    CKD (chronic kidney disease) stage 3, GFR 30-59 ml/min     Diabetes mellitus, type 2     Diastolic dysfunction     grade 2 on 4/17 ECHO    GERD (gastroesophageal reflux disease)     H/O cardiovascular stress test     normal 9/17    H/O colonoscopy     5/08    Hiatal hernia     noted on EGD    History of gout     History of SCC (squamous cell carcinoma) of skin     Hyperlipidemia     Hypertension     Ischemic  cardiomyopathy     EF 55% on 4/17 ECHO but was 25% on 12/16 ECHO    Mitral valve regurgitation     mild-moderate on 4/17 ECHO    Occipital neuralgia     Osteoporosis     noted on 6/17    Pulmonary hypertension     noted on 4/17 ECHO       Current Outpatient Prescriptions:     allopurinol (ZYLOPRIM) 100 MG tablet, Take 1 tablet (100 mg total) by mouth once daily., Disp: 90 tablet, Rfl: 1    alprazolam (XANAX) 0.25 MG tablet, Take 1 tablet (0.25 mg total) by mouth daily as needed for Anxiety., Disp: 30 tablet, Rfl: 0    aspirin (ECOTRIN) 81 MG EC tablet, Take 81 mg by mouth once daily., Disp: , Rfl:     ATROVENT HFA 17 mcg/actuation inhaler, INHALE 2 PUFFS INTO THE LUNGS EVERY 6 (SIX) HOURS., Disp: 4 Inhaler, Rfl: 3    blood sugar diagnostic Strp, 1 strip by Misc.(Non-Drug; Combo Route) route once daily. Accu-check Plus Test Strips Drum, Disp: 100 each, Rfl: 11    blood-glucose meter (BLOOD GLUCOSE MONITORING) kit, Use as instructed, Disp: 1 each, Rfl: 0    calcium-vitamin D3 (CALCIUM 500 + D) 500 mg(1,250mg) -200 unit per tablet, Take 1 tablet by mouth 2 (two) times daily with meals., Disp: , Rfl:     carbamazepine (TEGRETOL XR) 100 MG 12 hr tablet, Take 100 mg by mouth 2 (two) times daily., Disp: , Rfl:     clopidogrel (PLAVIX) 75 mg tablet, TAKE 1 TABLET ONE TIME DAILY, Disp: 90 tablet, Rfl: 0    cranberry 400 mg Cap, Take 500 mg by mouth once daily. , Disp: , Rfl:     DULoxetine (CYMBALTA) 30 MG capsule, Take 1 capsule (30 mg total) by mouth once daily., Disp: 30 capsule, Rfl: 1    felodipine (PLENDIL) 5 MG 24 hr tablet, Take 1 tablet (5 mg total) by mouth every evening., Disp: 90 tablet, Rfl: 3    ferrous sulfate (IRON) 325 mg (65 mg iron) Tab tablet, Take 325 mg by mouth every other day., Disp: , Rfl:     fluticasone (FLONASE) 50 mcg/actuation nasal spray, 2 sprays by Each Nare route once daily. (Patient taking differently: 2 sprays by Each Nare route daily as needed. ), Disp: 16 g, Rfl: 11     furosemide (LASIX) 10 mg/mL (alcohol free) solution, Take 1 mL (10 mg total) by mouth once daily., Disp: 120 mL, Rfl: 3    GINGER ROOT, BULK, MISC, 550 mg by Misc.(Non-Drug; Combo Route) route once daily., Disp: , Rfl:     hydrALAZINE (APRESOLINE) 25 MG tablet, Take 1 tablet (25 mg total) by mouth every 8 (eight) hours., Disp: 270 tablet, Rfl: 3    ipratropium (ATROVENT) 0.02 % nebulizer solution, Take 500 mcg by nebulization 4 (four) times daily as needed. Rescue , Disp: , Rfl:     isosorbide mononitrate (IMDUR) 60 MG 24 hr tablet, Take 1 tablet (60 mg total) by mouth every evening., Disp: 90 tablet, Rfl: 3    labetalol (NORMODYNE) 200 MG tablet, Take 1 tablet (200 mg total) by mouth every 12 (twelve) hours., Disp: 180 tablet, Rfl: 0    magnesium oxide (MAG-OX) 400 mg tablet, Take 500 mg by mouth once daily. , Disp: , Rfl:     multivitamin (ONE DAILY MULTIVITAMIN) per tablet, Take 1 tablet by mouth once daily., Disp: , Rfl:     nebulizer and compressor (PORTABLE NEBULIZER SYSTEM) Jeana, Pt needs portable battery operated nebulizer., Disp: 1 each, Rfl: 0    nitroGLYCERIN (NITROSTAT) 0.4 MG SL tablet, Place 1 tablet (0.4 mg total) under the tongue every 5 (five) minutes as needed for Chest pain., Disp: 25 tablet, Rfl: 3    omega-3 acid ethyl esters (LOVAZA) 1 gram capsule, Take 1 g by mouth 2 (two) times daily., Disp: , Rfl:     omeprazole (PRILOSEC) 20 MG capsule, Take 20 mg by mouth once daily., Disp: , Rfl:     simvastatin (ZOCOR) 20 MG tablet, Take 1 tablet (20 mg total) by mouth every evening., Disp: 90 tablet, Rfl: 3    tolterodine (DETROL LA) 4 MG 24 hr capsule, TAKE 1 CAPSULE ONE TIME DAILY, Disp: 90 capsule, Rfl: 0  Review of patient's allergies indicates:   Allergen Reactions    Albuterol Shortness Of Breath    Influenza a (h1n1) vac 09 (pf)      Other reaction(s): Angioedema    Nortriptyline      Causes headaches    Pneumococcal vaccine     Prevnar 13 [pneumoc 13-abram conj-dip cr(pf)] Edema          REVIEW OF SYSTEMS:     CONSTITUTIONAL: The patient denies any weight change. There is no apparent    change in appetite, fever, night sweats, headaches, fatigue, dizziness, or    weakness.      SKIN: Denies rash, issues with nails, non-healing sores, bleeding, blotching    skin or abnormal bruising. Denies new moles or changes to existing moles.      BREASTS: There is no swelling around breasts or nipple discharge.    EYES: Denies eye pain, blurred vision, swelling, redness or discharge.      ENT AND MOUTH: Denies runny nose, stuffiness, sinus trouble or sores. Denies    nosebleeds. Denies, hoarseness, change in voice or swelling in front of the    neck.      CARDIOVASCULAR: Denies chest pain, discomfort or palpitations. Denies neck    swelling or episodes of passing out.      RESPIRATORY: Denies cough, sputum production, blood in sputum, and denies    shortness of breath.      GI: Denies trouble swallowing, indigestion, heartburn, abdominal pain, nausea,    vomiting, diarrhea, altered bowel habits, blood in stool, discoloration of    stools, change in nature of stool, bloating, increased abdominal girth.      GENITOURINARY: No discharge. No pelvic pain or lumps. No rash around groin or  lesions. No urinary frequency, hesitation, painful urination or blood in    urine. Denies incontinence. No problems with intercourse.      MUSCULOSKELETAL: Denies neck or back pain. Denies weakness in arms or legs,    joint problems or distended inflamed veins in legs. Denies swelling or abnormal  glands.      NEUROLOGICAL: Denies tingling, numbness, altered mentation changes to nerve    function in the face, weakness to one or both of the body. Denies changes to    gait and denies multiple falls or accidents.      PHYSICAL EXAM:     Wt Readings from Last 3 Encounters:   11/02/17 58.1 kg (128 lb 1.4 oz)   10/25/17 62.7 kg (138 lb 3.2 oz)   10/23/17 64.3 kg (141 lb 12.1 oz)     Temp Readings from Last 3 Encounters:   11/02/17  97.6 °F (36.4 °C)   10/25/17 97.9 °F (36.6 °C) (Oral)   10/10/17 97.9 °F (36.6 °C)     BP Readings from Last 3 Encounters:   11/02/17 (!) 148/65   10/25/17 (!) 148/82   10/23/17 (!) 154/67     Pulse Readings from Last 3 Encounters:   11/02/17 65   10/25/17 65   10/23/17 71     GENERAL: Comfortable looking patient. Patient is in no distress.  Awake, alert and oriented to time, person and place.  No anxiety, or agitation.      HEENT: Normal conjunctivae and eyelids. WNL.  PERRLA 3 to 4 mm. No icterus, no pallor, no congestion, and no discharge noted.     NECK:  Supple. Trachea is central.  No crepitus.  No JVD or masses.    RESPIRATORY:  No intercostal retractions.  No dullness to percussion.  Chest is clear to auscultation.  No rales, rhonchi or wheezes.  No crepitus.  Good air entry bilaterally.    CARDIOVASCULAR:  S1 and S2 are normally heard without murmurs or gallops.  All peripheral pulses are present.    ABDOMEN:  Normal abdomen.  No hepatosplenomegaly.  No free fluid.  Bowel sounds are present.  No hernia noted. No masses.  No rebound or tenderness.  No guarding or rigidity.  Umbilicus is midline.    LYMPHATICS:  No axillary, cervical, supraclavicular, submental, or inguinal lymphadenopathy.    SKIN/MUSCULOSKELETAL:  There is no evidence of excoriation marks or ecchmosis.  No rashes.  No cyanosis.  No clubbing.  No joint or skeletal deformities noted.  Normal range of motion.    NEUROLOGIC:  Higher functions are appropriate.  No cranial nerve deficits.  Normal ken.  Normal strength.  Motor and sensory functions are normal.  Deep tendon reflexes are normal.    GENITAL/RECTAL:  Exams are deferred.      Laboratory:     CBC:  Lab Results   Component Value Date    WBC 8.09 10/10/2017    RBC 3.48 (L) 10/10/2017    HGB 11.1 (L) 10/10/2017    HCT 34.1 (L) 10/10/2017    MCV 98 10/10/2017    MCH 31.9 (H) 10/10/2017    MCHC 32.6 10/10/2017    RDW 14.6 (H) 10/10/2017     10/10/2017    MPV 10.5 10/10/2017    GRAN  5.2 10/10/2017    GRAN 63.8 10/10/2017    LYMPH 2.0 10/10/2017    LYMPH 24.8 10/10/2017    MONO 0.6 10/10/2017    MONO 7.7 10/10/2017    EOS 0.3 10/10/2017    BASO 0.05 10/10/2017    EOSINOPHIL 3.1 10/10/2017    BASOPHIL 0.6 10/10/2017       BMP: BMP  Lab Results   Component Value Date     09/29/2017    K 4.2 09/29/2017     09/29/2017    CO2 26 09/29/2017    BUN 52 (H) 09/29/2017    CREATININE 1.62 (H) 09/29/2017    CALCIUM 8.8 09/29/2017    ANIONGAP 6 (L) 09/29/2017    ESTGFRAFRICA 31 (A) 09/29/2017    EGFRNONAA 27 (A) 09/29/2017     Hemolysis w/u negative   aspontaneous recovery of hgb  spe neg  b12 folate and iron all normal  LFT:   Lab Results   Component Value Date    ALT 19 09/26/2017    AST 45 (H) 09/26/2017    ALKPHOS 66 09/26/2017    BILITOT 1.3 09/26/2017         Assessment/Plan:     anemia secondary to ckd , has recovered, no need for growth factor, cont f/u with renal   rtc 3 months with cbc, cmp    cont dm and htn mx as before good control

## 2017-11-03 ENCOUNTER — OUTPATIENT CASE MANAGEMENT (OUTPATIENT)
Dept: ADMINISTRATIVE | Facility: OTHER | Age: 82
End: 2017-11-03

## 2017-11-03 NOTE — PROGRESS NOTES
11/3/2017  Call to daughter to enroll patient in [facility], daughter declined enrollment, will mail brochure and business card to home address.  PREM Mary

## 2017-11-06 ENCOUNTER — LAB VISIT (OUTPATIENT)
Dept: LAB | Facility: HOSPITAL | Age: 82
End: 2017-11-06
Attending: INTERNAL MEDICINE
Payer: MEDICARE

## 2017-11-06 DIAGNOSIS — N17.9 ACUTE RENAL FAILURE, UNSPECIFIED ACUTE RENAL FAILURE TYPE: ICD-10-CM

## 2017-11-06 DIAGNOSIS — N18.30 CHRONIC KIDNEY DISEASE, STAGE III (MODERATE): ICD-10-CM

## 2017-11-06 LAB
BILIRUB UR QL STRIP: NEGATIVE
CLARITY UR: CLEAR
COLOR UR: YELLOW
CREAT UR-MCNC: 95 MG/DL
GLUCOSE UR QL STRIP: NEGATIVE
HGB UR QL STRIP: NEGATIVE
KETONES UR QL STRIP: NEGATIVE
LEUKOCYTE ESTERASE UR QL STRIP: NEGATIVE
NITRITE UR QL STRIP: NEGATIVE
PH UR STRIP: 6 [PH] (ref 5–8)
PROT UR QL STRIP: NEGATIVE
PROT UR-MCNC: 15 MG/DL
PROT/CREAT RATIO, UR: 0.16
SP GR UR STRIP: 1.02 (ref 1–1.03)
URN SPEC COLLECT METH UR: NORMAL

## 2017-11-06 PROCEDURE — 84156 ASSAY OF PROTEIN URINE: CPT

## 2017-11-06 PROCEDURE — 81003 URINALYSIS AUTO W/O SCOPE: CPT | Mod: PO

## 2017-11-09 RX ORDER — FELODIPINE 5 MG/1
TABLET, EXTENDED RELEASE ORAL
Qty: 90 TABLET | Refills: 3 | Status: SHIPPED | OUTPATIENT
Start: 2017-11-09 | End: 2019-01-01

## 2017-11-13 ENCOUNTER — OFFICE VISIT (OUTPATIENT)
Dept: NEPHROLOGY | Facility: CLINIC | Age: 82
End: 2017-11-13
Payer: MEDICARE

## 2017-11-13 VITALS
DIASTOLIC BLOOD PRESSURE: 80 MMHG | HEIGHT: 58 IN | HEART RATE: 62 BPM | OXYGEN SATURATION: 98 % | WEIGHT: 135.81 LBS | BODY MASS INDEX: 28.51 KG/M2 | SYSTOLIC BLOOD PRESSURE: 136 MMHG

## 2017-11-13 DIAGNOSIS — N18.30 CHRONIC KIDNEY DISEASE, STAGE III (MODERATE): Primary | ICD-10-CM

## 2017-11-13 PROCEDURE — 99499 UNLISTED E&M SERVICE: CPT | Mod: S$GLB,,, | Performed by: INTERNAL MEDICINE

## 2017-11-13 PROCEDURE — 99214 OFFICE O/P EST MOD 30 MIN: CPT | Mod: S$GLB,,, | Performed by: INTERNAL MEDICINE

## 2017-11-13 PROCEDURE — 99999 PR PBB SHADOW E&M-EST. PATIENT-LVL III: CPT | Mod: PBBFAC,,, | Performed by: INTERNAL MEDICINE

## 2017-11-13 NOTE — PROGRESS NOTES
"Subjective:       Patient ID: Kristen Nicole is a 89 y.o. White female who presents for return patient evaluation for chronic renal failure.    She has no uremic or urinary symptoms and is in her usual state of health.  There have been no recent illnesses, hospitalizations or procedures.  She admits that she does not drink much fluid and that she really likes her sweets.      Review of Systems   Constitutional: Negative for appetite change, chills and fever.   Eyes: Negative for visual disturbance.   Respiratory: Positive for shortness of breath (intermittent). Negative for cough.    Cardiovascular: Negative for chest pain and leg swelling.   Gastrointestinal: Negative for abdominal pain, diarrhea, nausea and vomiting.   Genitourinary: Negative for difficulty urinating, dysuria and hematuria.   Musculoskeletal: Positive for arthralgias (hands) and back pain. Negative for myalgias.   Skin: Negative for rash.   Neurological: Negative for headaches.   Psychiatric/Behavioral: Negative for sleep disturbance. The patient is nervous/anxious.        The past medical, family and social histories were reviewed for this encounter.     /80   Pulse 62   Ht 4' 10" (1.473 m)   Wt 61.6 kg (135 lb 12.9 oz)   LMP  (LMP Unknown)   SpO2 98%   BMI 28.38 kg/m²     Objective:      Physical Exam   Constitutional: She is oriented to person, place, and time. She appears well-developed and well-nourished. No distress.   HENT:   Head: Normocephalic and atraumatic.   Eyes: Conjunctivae are normal.   Neck: Neck supple. No JVD present.   Cardiovascular: Normal rate, regular rhythm and normal heart sounds.  Exam reveals no gallop and no friction rub.    No murmur heard.  Pulmonary/Chest: Effort normal and breath sounds normal. No respiratory distress. She has no wheezes. She has no rales.   Abdominal: Soft. Bowel sounds are normal. She exhibits no distension. There is no tenderness.   Musculoskeletal: She exhibits no edema. "   Decreased pulses in hands and feet   Neurological: She is alert and oriented to person, place, and time.   Skin: Skin is warm and dry. No rash noted.   Psychiatric: She has a normal mood and affect.   Vitals reviewed.      Assessment:       1. Chronic kidney disease, stage III (moderate)        Plan:   Return to clinic in 7 months.  Labs for next visit include rp, pth.  Baseline creatinine is 1.3 from 4947-9326.  Since that time her creatinine has ranged from 1.4-1.7.  She has a renal US which showed small kidneys bilaterally at 8.0 and 8.3 cm.  I reassured her and asked her to drink more fluids as dehydration can cause her creatinine to be labile like it has been the past two years.  I do not see any particularly nephrotoxic medications but

## 2017-11-30 PROBLEM — R07.9 CHEST PAIN, MODERATE CORONARY ARTERY RISK: Status: ACTIVE | Noted: 2017-11-30

## 2017-12-22 ENCOUNTER — OFFICE VISIT (OUTPATIENT)
Dept: FAMILY MEDICINE | Facility: CLINIC | Age: 82
End: 2017-12-22
Payer: MEDICARE

## 2017-12-22 VITALS
DIASTOLIC BLOOD PRESSURE: 48 MMHG | HEIGHT: 58 IN | BODY MASS INDEX: 28.73 KG/M2 | SYSTOLIC BLOOD PRESSURE: 140 MMHG | HEART RATE: 60 BPM | WEIGHT: 136.88 LBS | RESPIRATION RATE: 20 BRPM | TEMPERATURE: 98 F

## 2017-12-22 DIAGNOSIS — S61.011D LACERATION OF RIGHT THUMB WITHOUT FOREIGN BODY WITHOUT DAMAGE TO NAIL, SUBSEQUENT ENCOUNTER: Primary | ICD-10-CM

## 2017-12-22 DIAGNOSIS — Z48.02 VISIT FOR SUTURE REMOVAL: ICD-10-CM

## 2017-12-22 PROCEDURE — 99024 POSTOP FOLLOW-UP VISIT: CPT | Mod: S$GLB,,, | Performed by: NURSE PRACTITIONER

## 2017-12-22 PROCEDURE — 99214 OFFICE O/P EST MOD 30 MIN: CPT | Mod: 25,S$GLB,, | Performed by: NURSE PRACTITIONER

## 2017-12-22 NOTE — PROGRESS NOTES
"Subjective:       Patient ID: Kristen Nicole is a 89 y.o. female.    Chief Complaint: 3 stich removal from right thumb    HPI she fell and sustained a laceration to her right thumb pad. She had 3 stitches placed. She is here to have those removed. She has tenderness to the thumb pad. She also has a skin tear to her right forearm. That is healing well. No other concerns. See ROS.    The following portion of the patients history was reviewed and updated as appropriate: allergies, current medications, past medical and surgical history. Past social history and problem list reviewed. Family PMH and Past social history reviewed. Tobacco, Illicit drug use reviewed.     Review of Systems   Constitutional: Negative for fatigue.   Respiratory: Negative for cough, shortness of breath and wheezing.    Cardiovascular: Negative for chest pain and palpitations.   Musculoskeletal: Negative.    Skin:        See HPI       Objective:     BP (!) 140/48 (BP Location: Left arm, Patient Position: Sitting, BP Method: Medium (Manual))   Pulse 60   Temp 97.9 °F (36.6 °C) (Oral)   Resp 20   Ht 4' 10" (1.473 m)   Wt 62.1 kg (136 lb 14.5 oz)   LMP  (LMP Unknown)   BMI 28.61 kg/m²      Physical Exam     Constitutional: oriented to person, place, and time. well-developed and well-nourished.   Cardiovascular: Normal rate, regular rhythm and normal heart sounds.    Pulmonary/Chest: Effort normal and breath sounds normal. No respiratory distress. No wheezes.   Musculoskeletal: Normal range of motion.   Gait slow, steady.   Neurological: oriented to person, place, and time.   Skin: Skin is warm and dry. No rashes  Small crescent laceration to right pad of thumb. 3 stitches intact. Healing well.   Assessment:       1. Laceration of right thumb without foreign body without damage to nail, subsequent encounter    2. Visit for suture removal        Plan:         Kristen was seen today for 3 stich removal from right thumb.    Diagnoses and all " orders for this visit:    Laceration of right thumb without foreign body without damage to nail, subsequent encounter    Visit for suture removal: see procedure note. Tolerated well.     Continue current medication  Take medications only as prescribed  Healthy diet, exercise  Adequate rest  Adequate hydration  Avoid allergens  Avoid excessive caffeine

## 2017-12-22 NOTE — PROCEDURES
Suture Removal  Date/Time: 12/22/2017 8:11 AM  Location procedure was performed: [facility]  Performed by: BAKARI CAR  Authorized by: BAKARI CAR   Body area: upper extremity  Location details: right thumb  Description of findings: crescent shaped laceration inner pad right thumb   Wound Appearance: clean, well healed, tender and no drainage  Sutures Removed: 3  Post-removal: Steri-Strips applied  Complications: No  Estimated blood loss (mL): 0  Specimens: No  Implants: No  Patient tolerance: Patient tolerated the procedure well with no immediate complications

## 2017-12-26 ENCOUNTER — TELEPHONE (OUTPATIENT)
Dept: FAMILY MEDICINE | Facility: CLINIC | Age: 82
End: 2017-12-26

## 2017-12-26 NOTE — TELEPHONE ENCOUNTER
Pt daughter stopped Duloxetine due to pt not wanting to get out of bed. Pt didn't want to eat . Pt stopped med and now is back to normal ( doing activities with her friends) .  Also , pt is taking Plendil 5 mg qd, Hydralazine 25 mg tid , Labetalol 100 mg bid . Pt c/o dizziness occasionally . Recommended pt keep BP log at home so we can see readings before we make any  adjustments.--lp

## 2017-12-27 NOTE — TELEPHONE ENCOUNTER
Ok  Be slow with position changes to decrease chance of dizziness  May come in for nurse visit for BP check with orthostatic BP and HR

## 2017-12-28 NOTE — TELEPHONE ENCOUNTER
Spoke to pt's daughter.   Informed her of recommendations, sched nurse visit for next week and advised her to bring in BP readings.

## 2018-01-01 ENCOUNTER — TELEPHONE (OUTPATIENT)
Dept: FAMILY MEDICINE | Facility: CLINIC | Age: 83
End: 2018-01-01

## 2018-01-01 ENCOUNTER — HOSPITAL ENCOUNTER (OUTPATIENT)
Dept: RADIOLOGY | Facility: HOSPITAL | Age: 83
Discharge: HOME | End: 2018-08-13
Attending: FAMILY MEDICINE
Payer: MEDICARE

## 2018-01-01 ENCOUNTER — OFFICE VISIT (OUTPATIENT)
Dept: NEPHROLOGY | Facility: CLINIC | Age: 83
End: 2018-01-01
Payer: MEDICARE

## 2018-01-01 ENCOUNTER — OFFICE VISIT (OUTPATIENT)
Dept: FAMILY MEDICINE | Facility: CLINIC | Age: 83
End: 2018-01-01
Payer: MEDICARE

## 2018-01-01 ENCOUNTER — TELEPHONE (OUTPATIENT)
Dept: HEMATOLOGY/ONCOLOGY | Facility: CLINIC | Age: 83
End: 2018-01-01

## 2018-01-01 ENCOUNTER — OFFICE VISIT (OUTPATIENT)
Dept: CARDIOLOGY | Facility: CLINIC | Age: 83
End: 2018-01-01
Payer: MEDICARE

## 2018-01-01 ENCOUNTER — PES CALL (OUTPATIENT)
Dept: ADMINISTRATIVE | Facility: CLINIC | Age: 83
End: 2018-01-01

## 2018-01-01 ENCOUNTER — LAB VISIT (OUTPATIENT)
Dept: LAB | Facility: HOSPITAL | Age: 83
End: 2018-01-01
Attending: INTERNAL MEDICINE
Payer: MEDICARE

## 2018-01-01 ENCOUNTER — OFFICE VISIT (OUTPATIENT)
Dept: HEMATOLOGY/ONCOLOGY | Facility: CLINIC | Age: 83
End: 2018-01-01
Payer: MEDICARE

## 2018-01-01 VITALS
DIASTOLIC BLOOD PRESSURE: 80 MMHG | WEIGHT: 137.81 LBS | RESPIRATION RATE: 20 BRPM | SYSTOLIC BLOOD PRESSURE: 168 MMHG | TEMPERATURE: 98 F | OXYGEN SATURATION: 97 % | HEIGHT: 58 IN | BODY MASS INDEX: 28.93 KG/M2 | HEART RATE: 58 BPM

## 2018-01-01 VITALS
HEART RATE: 76 BPM | HEIGHT: 58 IN | BODY MASS INDEX: 28.88 KG/M2 | WEIGHT: 137.56 LBS | TEMPERATURE: 98 F | RESPIRATION RATE: 24 BRPM | SYSTOLIC BLOOD PRESSURE: 151 MMHG | DIASTOLIC BLOOD PRESSURE: 67 MMHG

## 2018-01-01 VITALS
DIASTOLIC BLOOD PRESSURE: 66 MMHG | BODY MASS INDEX: 28.05 KG/M2 | WEIGHT: 139.13 LBS | HEIGHT: 59 IN | HEART RATE: 68 BPM | SYSTOLIC BLOOD PRESSURE: 144 MMHG | OXYGEN SATURATION: 98 %

## 2018-01-01 VITALS
WEIGHT: 138.69 LBS | RESPIRATION RATE: 94 BRPM | SYSTOLIC BLOOD PRESSURE: 124 MMHG | HEART RATE: 62 BPM | HEIGHT: 58 IN | DIASTOLIC BLOOD PRESSURE: 64 MMHG | BODY MASS INDEX: 29.11 KG/M2

## 2018-01-01 VITALS
DIASTOLIC BLOOD PRESSURE: 70 MMHG | WEIGHT: 141.31 LBS | HEART RATE: 73 BPM | SYSTOLIC BLOOD PRESSURE: 162 MMHG | HEIGHT: 59 IN | BODY MASS INDEX: 28.49 KG/M2

## 2018-01-01 VITALS
DIASTOLIC BLOOD PRESSURE: 62 MMHG | TEMPERATURE: 97 F | HEART RATE: 76 BPM | HEIGHT: 58 IN | SYSTOLIC BLOOD PRESSURE: 154 MMHG | RESPIRATION RATE: 20 BRPM | BODY MASS INDEX: 28.84 KG/M2

## 2018-01-01 DIAGNOSIS — I25.5 ISCHEMIC CARDIOMYOPATHY: ICD-10-CM

## 2018-01-01 DIAGNOSIS — E11.22 TYPE 2 DIABETES MELLITUS WITH STAGE 4 CHRONIC KIDNEY DISEASE, WITHOUT LONG-TERM CURRENT USE OF INSULIN: ICD-10-CM

## 2018-01-01 DIAGNOSIS — E11.8 TYPE 2 DIABETES MELLITUS WITH COMPLICATION, WITHOUT LONG-TERM CURRENT USE OF INSULIN: ICD-10-CM

## 2018-01-01 DIAGNOSIS — I77.9 CAROTID ARTERY DISEASE, UNSPECIFIED LATERALITY: Primary | ICD-10-CM

## 2018-01-01 DIAGNOSIS — R93.89 ABNORMAL CAROTID ULTRASOUND: ICD-10-CM

## 2018-01-01 DIAGNOSIS — I51.9 SYSTOLIC DYSFUNCTION: ICD-10-CM

## 2018-01-01 DIAGNOSIS — N18.30 CHRONIC KIDNEY DISEASE, STAGE III (MODERATE): ICD-10-CM

## 2018-01-01 DIAGNOSIS — M19.90 ARTHRITIS: ICD-10-CM

## 2018-01-01 DIAGNOSIS — N18.30 ANEMIA DUE TO STAGE 3 (MODERATE) CHRONIC RENAL FAILURE TREATED WITH ERYTHROPOIETIN: Primary | ICD-10-CM

## 2018-01-01 DIAGNOSIS — N18.4 TYPE 2 DIABETES MELLITUS WITH STAGE 4 CHRONIC KIDNEY DISEASE, WITHOUT LONG-TERM CURRENT USE OF INSULIN: ICD-10-CM

## 2018-01-01 DIAGNOSIS — I25.10 ATHEROSCLEROSIS OF NATIVE CORONARY ARTERY OF NATIVE HEART WITHOUT ANGINA PECTORIS: ICD-10-CM

## 2018-01-01 DIAGNOSIS — I65.21 STENOSIS OF RIGHT CAROTID ARTERY: ICD-10-CM

## 2018-01-01 DIAGNOSIS — E11.21 CONTROLLED TYPE 2 DIABETES MELLITUS WITH DIABETIC NEPHROPATHY, WITHOUT LONG-TERM CURRENT USE OF INSULIN: ICD-10-CM

## 2018-01-01 DIAGNOSIS — Z00.00 ENCOUNTER FOR PREVENTIVE HEALTH EXAMINATION: Primary | ICD-10-CM

## 2018-01-01 DIAGNOSIS — R07.81 RIB PAIN ON LEFT SIDE: ICD-10-CM

## 2018-01-01 DIAGNOSIS — E11.69 HYPERLIPIDEMIA ASSOCIATED WITH TYPE 2 DIABETES MELLITUS: ICD-10-CM

## 2018-01-01 DIAGNOSIS — N18.30 ANEMIA DUE TO STAGE 3 (MODERATE) CHRONIC RENAL FAILURE TREATED WITH ERYTHROPOIETIN: ICD-10-CM

## 2018-01-01 DIAGNOSIS — D63.1 ANEMIA DUE TO CHRONIC RENAL FAILURE TREATED WITH ERYTHROPOIETIN, STAGE 2 (MILD): ICD-10-CM

## 2018-01-01 DIAGNOSIS — I67.2 CEREBRAL ATHEROSCLEROSIS: ICD-10-CM

## 2018-01-01 DIAGNOSIS — Z91.81 RISK FOR FALLS: ICD-10-CM

## 2018-01-01 DIAGNOSIS — E79.0 HYPERURICEMIA: ICD-10-CM

## 2018-01-01 DIAGNOSIS — N18.4 CHRONIC KIDNEY DISEASE (CKD) STAGE G4/A1, SEVERELY DECREASED GLOMERULAR FILTRATION RATE (GFR) BETWEEN 15-29 ML/MIN/1.73 SQUARE METER AND ALBUMINURIA CREATININE RATIO LESS THAN 30 MG/G: ICD-10-CM

## 2018-01-01 DIAGNOSIS — E11.59 HYPERTENSION ASSOCIATED WITH DIABETES: ICD-10-CM

## 2018-01-01 DIAGNOSIS — I44.7 LEFT BUNDLE BRANCH BLOCK: ICD-10-CM

## 2018-01-01 DIAGNOSIS — E78.5 HYPERLIPIDEMIA WITH TARGET LDL LESS THAN 70: ICD-10-CM

## 2018-01-01 DIAGNOSIS — I10 ESSENTIAL HYPERTENSION: ICD-10-CM

## 2018-01-01 DIAGNOSIS — S22.42XG CLOSED FRACTURE OF MULTIPLE RIBS OF LEFT SIDE WITH DELAYED HEALING, SUBSEQUENT ENCOUNTER: ICD-10-CM

## 2018-01-01 DIAGNOSIS — D63.1 ANEMIA DUE TO STAGE 3 (MODERATE) CHRONIC RENAL FAILURE TREATED WITH ERYTHROPOIETIN: Primary | ICD-10-CM

## 2018-01-01 DIAGNOSIS — R06.02 SHORTNESS OF BREATH: ICD-10-CM

## 2018-01-01 DIAGNOSIS — R07.9 CHEST PAIN, MODERATE CORONARY ARTERY RISK: ICD-10-CM

## 2018-01-01 DIAGNOSIS — I25.110 CORONARY ARTERY DISEASE INVOLVING NATIVE CORONARY ARTERY OF NATIVE HEART WITH UNSTABLE ANGINA PECTORIS: ICD-10-CM

## 2018-01-01 DIAGNOSIS — R11.0 NAUSEA: ICD-10-CM

## 2018-01-01 DIAGNOSIS — I15.2 HYPERTENSION ASSOCIATED WITH DIABETES: ICD-10-CM

## 2018-01-01 DIAGNOSIS — I34.0 MITRAL VALVE INSUFFICIENCY, UNSPECIFIED ETIOLOGY: ICD-10-CM

## 2018-01-01 DIAGNOSIS — I70.0 ATHEROSCLEROSIS OF AORTA: ICD-10-CM

## 2018-01-01 DIAGNOSIS — Z95.5 STENTED CORONARY ARTERY: ICD-10-CM

## 2018-01-01 DIAGNOSIS — G47.9 SLEEP DISTURBANCE: ICD-10-CM

## 2018-01-01 DIAGNOSIS — E78.5 HYPERLIPIDEMIA ASSOCIATED WITH TYPE 2 DIABETES MELLITUS: ICD-10-CM

## 2018-01-01 DIAGNOSIS — N18.30 CHRONIC KIDNEY DISEASE, STAGE III (MODERATE): Primary | ICD-10-CM

## 2018-01-01 DIAGNOSIS — E11.8 TYPE 2 DIABETES MELLITUS WITH COMPLICATION, WITHOUT LONG-TERM CURRENT USE OF INSULIN: Primary | ICD-10-CM

## 2018-01-01 DIAGNOSIS — R07.9 CHEST PAIN AT REST: ICD-10-CM

## 2018-01-01 DIAGNOSIS — E78.5 HYPERLIPIDEMIA, UNSPECIFIED HYPERLIPIDEMIA TYPE: ICD-10-CM

## 2018-01-01 DIAGNOSIS — N18.2 ANEMIA DUE TO CHRONIC RENAL FAILURE TREATED WITH ERYTHROPOIETIN, STAGE 2 (MILD): ICD-10-CM

## 2018-01-01 DIAGNOSIS — J96.01 ACUTE RESPIRATORY FAILURE WITH HYPOXIA: ICD-10-CM

## 2018-01-01 DIAGNOSIS — I44.7 LEFT BUNDLE BRANCH BLOCK: Primary | ICD-10-CM

## 2018-01-01 DIAGNOSIS — I27.20 PULMONARY HYPERTENSION: ICD-10-CM

## 2018-01-01 DIAGNOSIS — M19.90 OSTEOARTHRITIS, UNSPECIFIED OSTEOARTHRITIS TYPE, UNSPECIFIED SITE: ICD-10-CM

## 2018-01-01 DIAGNOSIS — Z09 HOSPITAL DISCHARGE FOLLOW-UP: Primary | ICD-10-CM

## 2018-01-01 DIAGNOSIS — D63.1 ANEMIA DUE TO STAGE 3 (MODERATE) CHRONIC RENAL FAILURE TREATED WITH ERYTHROPOIETIN: ICD-10-CM

## 2018-01-01 DIAGNOSIS — E61.1 IRON DEFICIENCY: ICD-10-CM

## 2018-01-01 LAB
ALBUMIN SERPL BCP-MCNC: 4.1 G/DL
ALBUMIN SERPL BCP-MCNC: 4.1 G/DL
ALBUMIN SERPL BCP-MCNC: 4.4 G/DL
ALP SERPL-CCNC: 52 U/L
ALP SERPL-CCNC: 71 U/L
ALT SERPL W/O P-5'-P-CCNC: 28 U/L
ALT SERPL W/O P-5'-P-CCNC: 28 U/L
ANION GAP SERPL CALC-SCNC: 10 MMOL/L
ANION GAP SERPL CALC-SCNC: 10 MMOL/L
ANION GAP SERPL CALC-SCNC: 12 MMOL/L
AST SERPL-CCNC: 19 U/L
AST SERPL-CCNC: 22 U/L
BASOPHILS # BLD AUTO: 0.04 K/UL
BASOPHILS # BLD AUTO: 0.04 K/UL
BASOPHILS NFR BLD: 0.6 %
BASOPHILS NFR BLD: 0.6 %
BILIRUB SERPL-MCNC: 0.3 MG/DL
BILIRUB SERPL-MCNC: 0.4 MG/DL
BUN SERPL-MCNC: 34 MG/DL
BUN SERPL-MCNC: 52 MG/DL
BUN SERPL-MCNC: 52 MG/DL
CALCIUM SERPL-MCNC: 9.6 MG/DL
CALCIUM SERPL-MCNC: 9.7 MG/DL
CALCIUM SERPL-MCNC: 9.7 MG/DL
CHLORIDE SERPL-SCNC: 101 MMOL/L
CHOLEST SERPL-MCNC: 158 MG/DL
CHOLEST/HDLC SERPL: 2.8 {RATIO}
CO2 SERPL-SCNC: 26 MMOL/L
CO2 SERPL-SCNC: 27 MMOL/L
CO2 SERPL-SCNC: 27 MMOL/L
CREAT SERPL-MCNC: 1.49 MG/DL
CREAT SERPL-MCNC: 1.7 MG/DL
CREAT SERPL-MCNC: 1.7 MG/DL
DIFFERENTIAL METHOD: ABNORMAL
DIFFERENTIAL METHOD: ABNORMAL
EOSINOPHIL # BLD AUTO: 0.2 K/UL
EOSINOPHIL # BLD AUTO: 0.2 K/UL
EOSINOPHIL NFR BLD: 2.3 %
EOSINOPHIL NFR BLD: 3.2 %
ERYTHROCYTE [DISTWIDTH] IN BLOOD BY AUTOMATED COUNT: 13.9 %
ERYTHROCYTE [DISTWIDTH] IN BLOOD BY AUTOMATED COUNT: 14.6 %
EST. GFR  (AFRICAN AMERICAN): 29 ML/MIN/1.73 M^2
EST. GFR  (AFRICAN AMERICAN): 29 ML/MIN/1.73 M^2
EST. GFR  (AFRICAN AMERICAN): 34 ML/MIN/1.73 M^2
EST. GFR  (NON AFRICAN AMERICAN): 25 ML/MIN/1.73 M^2
EST. GFR  (NON AFRICAN AMERICAN): 25 ML/MIN/1.73 M^2
EST. GFR  (NON AFRICAN AMERICAN): 30 ML/MIN/1.73 M^2
ESTIMATED AVG GLUCOSE: 148 MG/DL
GLUCOSE SERPL-MCNC: 188 MG/DL
GLUCOSE SERPL-MCNC: 188 MG/DL
GLUCOSE SERPL-MCNC: 197 MG/DL
HBA1C MFR BLD: 6.8 %
HCT VFR BLD AUTO: 32.9 %
HCT VFR BLD AUTO: 33.8 %
HDLC SERPL-MCNC: 57 MG/DL
HDLC SERPL: 36.1 %
HGB BLD-MCNC: 10.7 G/DL
HGB BLD-MCNC: 10.9 G/DL
LDLC SERPL CALC-MCNC: 78.4 MG/DL
LYMPHOCYTES # BLD AUTO: 1.9 K/UL
LYMPHOCYTES # BLD AUTO: 2.1 K/UL
LYMPHOCYTES NFR BLD: 26.2 %
LYMPHOCYTES NFR BLD: 31.6 %
MCH RBC QN AUTO: 31.1 PG
MCH RBC QN AUTO: 31.8 PG
MCHC RBC AUTO-ENTMCNC: 32.2 G/DL
MCHC RBC AUTO-ENTMCNC: 32.5 G/DL
MCV RBC AUTO: 96 FL
MCV RBC AUTO: 98 FL
MONOCYTES # BLD AUTO: 0.6 K/UL
MONOCYTES # BLD AUTO: 0.6 K/UL
MONOCYTES NFR BLD: 7.8 %
MONOCYTES NFR BLD: 8.8 %
NEUTROPHILS # BLD AUTO: 3.7 K/UL
NEUTROPHILS # BLD AUTO: 4.4 K/UL
NEUTROPHILS NFR BLD: 56.7 %
NEUTROPHILS NFR BLD: 62.2 %
NONHDLC SERPL-MCNC: 101 MG/DL
NRBC BLD-RTO: 0 /100 WBC
NRBC BLD-RTO: 0 /100 WBC
PHOSPHATE SERPL-MCNC: 3.6 MG/DL
PLATELET # BLD AUTO: 194 K/UL
PLATELET # BLD AUTO: 200 K/UL
PMV BLD AUTO: 10.6 FL
PMV BLD AUTO: 10.9 FL
POTASSIUM SERPL-SCNC: 4.8 MMOL/L
POTASSIUM SERPL-SCNC: 4.8 MMOL/L
POTASSIUM SERPL-SCNC: 5.4 MMOL/L
PROT SERPL-MCNC: 6.8 G/DL
PROT SERPL-MCNC: 7.2 G/DL
PTH-INTACT SERPL-MCNC: 56 PG/ML
RBC # BLD AUTO: 3.36 M/UL
RBC # BLD AUTO: 3.51 M/UL
SODIUM SERPL-SCNC: 138 MMOL/L
SODIUM SERPL-SCNC: 138 MMOL/L
SODIUM SERPL-SCNC: 139 MMOL/L
TRIGL SERPL-MCNC: 113 MG/DL
URATE SERPL-MCNC: 6.2 MG/DL
WBC # BLD AUTO: 6.51 K/UL
WBC # BLD AUTO: 7.15 K/UL

## 2018-01-01 PROCEDURE — 99215 OFFICE O/P EST HI 40 MIN: CPT | Mod: PBBFAC,PO | Performed by: NURSE PRACTITIONER

## 2018-01-01 PROCEDURE — 99499 UNLISTED E&M SERVICE: CPT | Mod: S$GLB,,, | Performed by: FAMILY MEDICINE

## 2018-01-01 PROCEDURE — 99499 UNLISTED E&M SERVICE: CPT | Mod: S$GLB,,, | Performed by: NURSE PRACTITIONER

## 2018-01-01 PROCEDURE — 85025 COMPLETE CBC W/AUTO DIFF WBC: CPT

## 2018-01-01 PROCEDURE — 80053 COMPREHEN METABOLIC PANEL: CPT

## 2018-01-01 PROCEDURE — 84550 ASSAY OF BLOOD/URIC ACID: CPT

## 2018-01-01 PROCEDURE — 84550 ASSAY OF BLOOD/URIC ACID: CPT | Mod: PN

## 2018-01-01 PROCEDURE — 85025 COMPLETE CBC W/AUTO DIFF WBC: CPT | Mod: PN

## 2018-01-01 PROCEDURE — 1101F PT FALLS ASSESS-DOCD LE1/YR: CPT | Mod: CPTII,S$GLB,, | Performed by: NURSE PRACTITIONER

## 2018-01-01 PROCEDURE — 83970 ASSAY OF PARATHORMONE: CPT | Mod: PN

## 2018-01-01 PROCEDURE — 99214 OFFICE O/P EST MOD 30 MIN: CPT | Mod: S$GLB,,, | Performed by: FAMILY MEDICINE

## 2018-01-01 PROCEDURE — 99214 OFFICE O/P EST MOD 30 MIN: CPT | Mod: S$GLB,,, | Performed by: INTERNAL MEDICINE

## 2018-01-01 PROCEDURE — 80053 COMPREHEN METABOLIC PANEL: CPT | Mod: PN

## 2018-01-01 PROCEDURE — 99999 PR PBB SHADOW E&M-EST. PATIENT-LVL III: CPT | Mod: PBBFAC,,, | Performed by: INTERNAL MEDICINE

## 2018-01-01 PROCEDURE — 93880 EXTRACRANIAL BILAT STUDY: CPT | Mod: 26,,, | Performed by: RADIOLOGY

## 2018-01-01 PROCEDURE — 83036 HEMOGLOBIN GLYCOSYLATED A1C: CPT | Mod: PN

## 2018-01-01 PROCEDURE — 99214 OFFICE O/P EST MOD 30 MIN: CPT | Mod: S$GLB,,, | Performed by: NURSE PRACTITIONER

## 2018-01-01 PROCEDURE — 99499 UNLISTED E&M SERVICE: CPT | Mod: S$GLB,,, | Performed by: INTERNAL MEDICINE

## 2018-01-01 PROCEDURE — 99213 OFFICE O/P EST LOW 20 MIN: CPT | Mod: PBBFAC,PO | Performed by: INTERNAL MEDICINE

## 2018-01-01 PROCEDURE — 83036 HEMOGLOBIN GLYCOSYLATED A1C: CPT

## 2018-01-01 PROCEDURE — 80061 LIPID PANEL: CPT | Mod: PN

## 2018-01-01 PROCEDURE — 99999 PR PBB SHADOW E&M-EST. PATIENT-LVL V: CPT | Mod: PBBFAC,,, | Performed by: INTERNAL MEDICINE

## 2018-01-01 PROCEDURE — 3288F FALL RISK ASSESSMENT DOCD: CPT | Mod: CPTII,,, | Performed by: INTERNAL MEDICINE

## 2018-01-01 PROCEDURE — 93880 EXTRACRANIAL BILAT STUDY: CPT | Mod: TC,PO

## 2018-01-01 PROCEDURE — 80069 RENAL FUNCTION PANEL: CPT

## 2018-01-01 PROCEDURE — 36415 COLL VENOUS BLD VENIPUNCTURE: CPT | Mod: PN

## 2018-01-01 PROCEDURE — 99214 OFFICE O/P EST MOD 30 MIN: CPT | Mod: S$PBB,,, | Performed by: INTERNAL MEDICINE

## 2018-01-01 PROCEDURE — 83970 ASSAY OF PARATHORMONE: CPT

## 2018-01-01 PROCEDURE — 80069 RENAL FUNCTION PANEL: CPT | Mod: PN

## 2018-01-01 PROCEDURE — G0439 PPPS, SUBSEQ VISIT: HCPCS | Mod: S$GLB,,, | Performed by: NURSE PRACTITIONER

## 2018-01-01 PROCEDURE — 80061 LIPID PANEL: CPT

## 2018-01-01 PROCEDURE — 99999 PR PBB SHADOW E&M-EST. PATIENT-LVL V: CPT | Mod: PBBFAC,,, | Performed by: NURSE PRACTITIONER

## 2018-01-01 RX ORDER — ONDANSETRON 8 MG/1
8 TABLET, ORALLY DISINTEGRATING ORAL EVERY 6 HOURS PRN
Qty: 20 TABLET | Refills: 0 | Status: SHIPPED | OUTPATIENT
Start: 2018-01-01 | End: 2018-01-01 | Stop reason: ALTCHOICE

## 2018-01-01 RX ORDER — FUROSEMIDE 20 MG/1
20 TABLET ORAL 2 TIMES DAILY
Qty: 90 TABLET | Refills: 3 | Status: SHIPPED | OUTPATIENT
Start: 2018-01-01 | End: 2019-01-01

## 2018-01-01 RX ORDER — SUCRALFATE 1 G/1
1 TABLET ORAL 4 TIMES DAILY
Qty: 40 TABLET | Refills: 1 | Status: SHIPPED | OUTPATIENT
Start: 2018-01-01

## 2018-01-01 RX ORDER — OMEPRAZOLE 20 MG/1
CAPSULE, DELAYED RELEASE ORAL
Qty: 90 CAPSULE | Refills: 3 | Status: SHIPPED | OUTPATIENT
Start: 2018-01-01 | End: 2019-01-01

## 2018-01-01 RX ORDER — FERROUS SULFATE 325(65) MG
325 TABLET ORAL
Refills: 0
Start: 2018-01-01 | End: 2018-01-01

## 2018-01-01 RX ORDER — CARBAMAZEPINE 100 MG/1
100 TABLET, EXTENDED RELEASE ORAL 2 TIMES DAILY
Qty: 180 TABLET | Refills: 1 | Status: SHIPPED | OUTPATIENT
Start: 2018-01-01 | End: 2019-01-01 | Stop reason: SDUPTHER

## 2018-01-01 RX ORDER — NITROGLYCERIN 0.4 MG/1
TABLET SUBLINGUAL
Qty: 25 TABLET | Refills: 3 | Status: SHIPPED | OUTPATIENT
Start: 2018-01-01

## 2018-01-01 RX ORDER — HYDROCODONE BITARTRATE AND ACETAMINOPHEN 7.5; 325 MG/1; MG/1
1 TABLET ORAL EVERY 6 HOURS PRN
Qty: 40 TABLET | Refills: 0 | Status: SHIPPED | OUTPATIENT
Start: 2018-01-01 | End: 2018-01-01 | Stop reason: ALTCHOICE

## 2018-01-01 RX ORDER — LABETALOL 100 MG/1
TABLET, FILM COATED ORAL
Qty: 120 TABLET | Refills: 0 | Status: SHIPPED | OUTPATIENT
Start: 2018-01-01 | End: 2019-01-01

## 2018-01-01 RX ORDER — LABETALOL 100 MG/1
TABLET, FILM COATED ORAL
Qty: 120 TABLET | Refills: 0 | Status: SHIPPED | OUTPATIENT
Start: 2018-01-01 | End: 2018-01-01 | Stop reason: SDUPTHER

## 2018-01-01 RX ORDER — ALPRAZOLAM 0.25 MG/1
0.25 TABLET ORAL DAILY PRN
Qty: 30 TABLET | Refills: 0 | Status: SHIPPED | OUTPATIENT
Start: 2018-01-01 | End: 2018-01-01

## 2018-01-01 RX ORDER — ALLOPURINOL 100 MG/1
TABLET ORAL
Qty: 90 TABLET | Refills: 1 | Status: SHIPPED | OUTPATIENT
Start: 2018-01-01

## 2018-01-01 RX ORDER — LIDOCAINE 50 MG/G
1 PATCH TOPICAL DAILY
Qty: 30 PATCH | Refills: 2 | Status: SHIPPED | OUTPATIENT
Start: 2018-01-01 | End: 2018-01-01

## 2018-01-01 RX ORDER — HYDROXYZINE HYDROCHLORIDE 25 MG/1
12.5 TABLET, FILM COATED ORAL 4 TIMES DAILY PRN
Qty: 30 TABLET | Refills: 2 | Status: SHIPPED | OUTPATIENT
Start: 2018-01-01

## 2018-01-01 RX ORDER — PROMETHAZINE HYDROCHLORIDE 25 MG/1
25 TABLET ORAL EVERY 6 HOURS PRN
Qty: 30 TABLET | Refills: 1 | Status: SHIPPED | OUTPATIENT
Start: 2018-01-01 | End: 2018-01-01

## 2018-02-02 ENCOUNTER — OFFICE VISIT (OUTPATIENT)
Dept: HEMATOLOGY/ONCOLOGY | Facility: CLINIC | Age: 83
End: 2018-02-02
Payer: MEDICARE

## 2018-02-02 ENCOUNTER — LAB VISIT (OUTPATIENT)
Dept: LAB | Facility: HOSPITAL | Age: 83
End: 2018-02-02
Attending: INTERNAL MEDICINE
Payer: MEDICARE

## 2018-02-02 VITALS
OXYGEN SATURATION: 98 % | TEMPERATURE: 98 F | RESPIRATION RATE: 18 BRPM | HEART RATE: 66 BPM | BODY MASS INDEX: 29.11 KG/M2 | WEIGHT: 138.69 LBS | HEIGHT: 58 IN | SYSTOLIC BLOOD PRESSURE: 198 MMHG | DIASTOLIC BLOOD PRESSURE: 79 MMHG

## 2018-02-02 DIAGNOSIS — D63.1 ANEMIA DUE TO CHRONIC RENAL FAILURE TREATED WITH ERYTHROPOIETIN, STAGE 2 (MILD): Primary | ICD-10-CM

## 2018-02-02 DIAGNOSIS — E11.21 CONTROLLED TYPE 2 DIABETES MELLITUS WITH DIABETIC NEPHROPATHY, WITHOUT LONG-TERM CURRENT USE OF INSULIN: ICD-10-CM

## 2018-02-02 DIAGNOSIS — E78.5 HYPERLIPIDEMIA WITH TARGET LDL LESS THAN 70: ICD-10-CM

## 2018-02-02 DIAGNOSIS — D63.1 ANEMIA DUE TO STAGE 3 (MODERATE) CHRONIC RENAL FAILURE TREATED WITH ERYTHROPOIETIN: ICD-10-CM

## 2018-02-02 DIAGNOSIS — I25.10 ATHEROSCLEROSIS OF NATIVE CORONARY ARTERY OF NATIVE HEART WITHOUT ANGINA PECTORIS: ICD-10-CM

## 2018-02-02 DIAGNOSIS — N18.4 CHRONIC KIDNEY DISEASE (CKD) STAGE G4/A1, SEVERELY DECREASED GLOMERULAR FILTRATION RATE (GFR) BETWEEN 15-29 ML/MIN/1.73 SQUARE METER AND ALBUMINURIA CREATININE RATIO LESS THAN 30 MG/G: ICD-10-CM

## 2018-02-02 DIAGNOSIS — N18.2 ANEMIA DUE TO CHRONIC RENAL FAILURE TREATED WITH ERYTHROPOIETIN, STAGE 2 (MILD): Primary | ICD-10-CM

## 2018-02-02 DIAGNOSIS — I10 ESSENTIAL HYPERTENSION: ICD-10-CM

## 2018-02-02 DIAGNOSIS — N18.30 ANEMIA DUE TO STAGE 3 (MODERATE) CHRONIC RENAL FAILURE TREATED WITH ERYTHROPOIETIN: ICD-10-CM

## 2018-02-02 LAB
ALBUMIN SERPL BCP-MCNC: 4.4 G/DL
ALP SERPL-CCNC: 55 U/L
ALT SERPL W/O P-5'-P-CCNC: 26 U/L
ANION GAP SERPL CALC-SCNC: 10 MMOL/L
AST SERPL-CCNC: 25 U/L
BASOPHILS # BLD AUTO: 0.04 K/UL
BASOPHILS NFR BLD: 0.5 %
BILIRUB SERPL-MCNC: 0.3 MG/DL
BUN SERPL-MCNC: 37 MG/DL
CALCIUM SERPL-MCNC: 9.9 MG/DL
CHLORIDE SERPL-SCNC: 104 MMOL/L
CO2 SERPL-SCNC: 27 MMOL/L
CREAT SERPL-MCNC: 1.4 MG/DL
DIFFERENTIAL METHOD: ABNORMAL
EOSINOPHIL # BLD AUTO: 0.4 K/UL
EOSINOPHIL NFR BLD: 4.6 %
ERYTHROCYTE [DISTWIDTH] IN BLOOD BY AUTOMATED COUNT: 14 %
EST. GFR  (AFRICAN AMERICAN): 37 ML/MIN/1.73 M^2
EST. GFR  (NON AFRICAN AMERICAN): 32 ML/MIN/1.73 M^2
GLUCOSE SERPL-MCNC: 187 MG/DL
HCT VFR BLD AUTO: 32.1 %
HGB BLD-MCNC: 10.4 G/DL
LYMPHOCYTES # BLD AUTO: 1.9 K/UL
LYMPHOCYTES NFR BLD: 24.4 %
MCH RBC QN AUTO: 31.6 PG
MCHC RBC AUTO-ENTMCNC: 32.4 G/DL
MCV RBC AUTO: 98 FL
MONOCYTES # BLD AUTO: 0.7 K/UL
MONOCYTES NFR BLD: 8.7 %
NEUTROPHILS # BLD AUTO: 4.8 K/UL
NEUTROPHILS NFR BLD: 61.8 %
NRBC BLD-RTO: 0 /100 WBC
PLATELET # BLD AUTO: 179 K/UL
PMV BLD AUTO: 10.6 FL
POTASSIUM SERPL-SCNC: 5.5 MMOL/L
PROT SERPL-MCNC: 7.2 G/DL
RBC # BLD AUTO: 3.29 M/UL
SODIUM SERPL-SCNC: 141 MMOL/L
WBC # BLD AUTO: 7.69 K/UL

## 2018-02-02 PROCEDURE — 80053 COMPREHEN METABOLIC PANEL: CPT | Mod: PN

## 2018-02-02 PROCEDURE — 3008F BODY MASS INDEX DOCD: CPT | Mod: S$GLB,,, | Performed by: INTERNAL MEDICINE

## 2018-02-02 PROCEDURE — 36415 COLL VENOUS BLD VENIPUNCTURE: CPT | Mod: PN

## 2018-02-02 PROCEDURE — 85025 COMPLETE CBC W/AUTO DIFF WBC: CPT

## 2018-02-02 PROCEDURE — 80053 COMPREHEN METABOLIC PANEL: CPT

## 2018-02-02 PROCEDURE — 85025 COMPLETE CBC W/AUTO DIFF WBC: CPT | Mod: PN

## 2018-02-02 PROCEDURE — 99999 PR PBB SHADOW E&M-EST. PATIENT-LVL III: CPT | Mod: PBBFAC,,, | Performed by: INTERNAL MEDICINE

## 2018-02-02 PROCEDURE — 1159F MED LIST DOCD IN RCRD: CPT | Mod: S$GLB,,, | Performed by: INTERNAL MEDICINE

## 2018-02-02 PROCEDURE — 99499 UNLISTED E&M SERVICE: CPT | Mod: S$GLB,,, | Performed by: INTERNAL MEDICINE

## 2018-02-02 PROCEDURE — 99214 OFFICE O/P EST MOD 30 MIN: CPT | Mod: S$GLB,,, | Performed by: INTERNAL MEDICINE

## 2018-02-02 PROCEDURE — 1126F AMNT PAIN NOTED NONE PRSNT: CPT | Mod: S$GLB,,, | Performed by: INTERNAL MEDICINE

## 2018-02-02 NOTE — PROGRESS NOTES
Subjective:       Patient ID: Kristen Nicole is a 89 y.o. female.    Chief Complaint: here for anemia after w/u done to discuss results with daughter has renal appt in 1 week with labs as well pt haz fallen and had a hematoma doing better now  HPI:     Social History     Social History    Marital status:      Spouse name: N/A    Number of children: 5    Years of education: N/A     Occupational History    nurse      [employer]     Social History Main Topics    Smoking status: Never Smoker    Smokeless tobacco: Never Used    Alcohol use No    Drug use: No    Sexual activity: Not Currently     Other Topics Concern    Not on file     Social History Narrative    No narrative on file     Family History   Problem Relation Age of Onset    Brain cancer Mother     Heart disease Mother     Pancreatic cancer Father     Cancer Brother     Pancreatic cancer Son     Heart disease Sister     Heart disease Sister     Heart disease Sister     Hypertension Sister     Diabetes Sister     Cancer Sister      Breast     Past Surgical History:   Procedure Laterality Date    ANTERIOR CERVICAL DISCECTOMY W/ FUSION      Cervical Fusion    APPENDECTOMY      CATARACT EXTRACTION Bilateral     ORIF TIBIA & FIBULA FRACTURES      screws and wires in ankle    RETINAL DETACHMENT SURGERY Right     TONSILLECTOMY, ADENOIDECTOMY       Past Medical History:   Diagnosis Date    Arthritis     Asthma     CAD (coronary artery disease)     s/p LAD stent;  last cath 6/16 with 30-40% blockages    Carotid arterial disease     noted on USG 12/15    CKD (chronic kidney disease) stage 3, GFR 30-59 ml/min     Followed by Dr. Harry Robles    Diabetes mellitus, type 2     Diastolic dysfunction     grade 2 on 4/17 ECHO    GERD (gastroesophageal reflux disease)     H/O cardiovascular stress test     normal 9/17    H/O colonoscopy     5/08    Hiatal hernia     noted on EGD    History of gout     History of SCC (squamous  cell carcinoma) of skin     Hyperlipidemia     Hypertension     Ischemic cardiomyopathy     EF 55% on 4/17 ECHO but was 25% on 12/16 ECHO    Mitral valve regurgitation     mild-moderate on 4/17 ECHO    Occipital neuralgia     Osteoporosis     noted on 6/17    Pulmonary hypertension     noted on 4/17 ECHO       Current Outpatient Prescriptions:     allopurinol (ZYLOPRIM) 100 MG tablet, Take 1 tablet (100 mg total) by mouth once daily., Disp: 90 tablet, Rfl: 1    aspirin (ECOTRIN) 81 MG EC tablet, Take 81 mg by mouth once daily., Disp: , Rfl:     calcium-vitamin D3 (CALCIUM 500 + D) 500 mg(1,250mg) -200 unit per tablet, Take 1 tablet by mouth 2 (two) times daily with meals., Disp: , Rfl:     carbamazepine (TEGRETOL XR) 100 MG 12 hr tablet, Take 100 mg by mouth 2 (two) times daily., Disp: , Rfl:     clopidogrel (PLAVIX) 75 mg tablet, TAKE 1 TABLET ONE TIME DAILY, Disp: 90 tablet, Rfl: 0    cranberry 400 mg Cap, Take 500 mg by mouth once daily. , Disp: , Rfl:     diclofenac sodium 1 % Gel, Apply topically once daily., Disp: 100 g, Rfl: 0    felodipine (PLENDIL) 5 MG 24 hr tablet, TAKE 1 TABLET EVERY EVENING (SUBSTITUTED FOR  PLENDIL), Disp: 90 tablet, Rfl: 3    ferrous sulfate (IRON) 325 mg (65 mg iron) Tab tablet, Take 325 mg by mouth every other day., Disp: , Rfl:     fluticasone (FLONASE) 50 mcg/actuation nasal spray, 2 sprays by Each Nare route once daily., Disp: 16 g, Rfl: 11    furosemide (LASIX) 10 mg/mL (alcohol free) solution, Take 1 mL (10 mg total) by mouth once daily., Disp: 120 mL, Rfl: 3    GINGER ROOT, BULK, MISC, 550 mg by Misc.(Non-Drug; Combo Route) route once daily., Disp: , Rfl:     hydrALAZINE (APRESOLINE) 25 MG tablet, Take 1 tablet (25 mg total) by mouth every 8 (eight) hours., Disp: 270 tablet, Rfl: 3    hydrocodone-acetaminophen 5-325mg (NORCO) 5-325 mg per tablet, Take 1 tablet by mouth every 4 (four) hours as needed., Disp: 15 tablet, Rfl: 0    ipratropium (ATROVENT HFA)  17 mcg/actuation inhaler, Inhale 2 puffs into the lungs every 6 (six) hours as needed for Wheezing. Rescue, Disp: , Rfl:     ipratropium (ATROVENT) 0.02 % nebulizer solution, Take 500 mcg by nebulization 4 (four) times daily as needed. Rescue , Disp: , Rfl:     isosorbide mononitrate (IMDUR) 60 MG 24 hr tablet, Take 1 tablet (60 mg total) by mouth every evening., Disp: 90 tablet, Rfl: 3    labetalol (NORMODYNE) 100 MG tablet, Take 1 tablet (100 mg total) by mouth every 12 (twelve) hours., Disp: 60 tablet, Rfl: 1    magnesium oxide (MAG-OX) 400 mg tablet, Take 500 mg by mouth once daily. , Disp: , Rfl:     multivitamin (ONE DAILY MULTIVITAMIN) per tablet, Take 1 tablet by mouth once daily., Disp: , Rfl:     nebulizer and compressor (PORTABLE NEBULIZER SYSTEM) Jeana, Pt needs portable battery operated nebulizer., Disp: 1 each, Rfl: 0    nitroGLYCERIN (NITROSTAT) 0.4 MG SL tablet, Place 1 tablet (0.4 mg total) under the tongue every 5 (five) minutes as needed for Chest pain., Disp: 25 tablet, Rfl: 3    omeprazole (PRILOSEC) 20 MG capsule, Take 20 mg by mouth once daily., Disp: , Rfl:     simvastatin (ZOCOR) 20 MG tablet, Take 1 tablet (20 mg total) by mouth every evening., Disp: 90 tablet, Rfl: 3    tolterodine (DETROL LA) 4 MG 24 hr capsule, TAKE 1 CAPSULE ONE TIME DAILY, Disp: 90 capsule, Rfl: 0    omega-3 acid ethyl esters (LOVAZA) 1 gram capsule, Take 1 g by mouth every other day. , Disp: , Rfl:   Review of patient's allergies indicates:   Allergen Reactions    Albuterol Shortness Of Breath    Influenza a (h1n1) vac 09 (pf)      Other reaction(s): Angioedema    Nortriptyline      Causes headaches    Pneumococcal vaccine     Prevnar 13 [pneumoc 13-abram conj-dip cr(pf)] Edema         REVIEW OF SYSTEMS:     CONSTITUTIONAL: The patient denies any weight change. There is no apparent    change in appetite, fever, night sweats, headaches, fatigue, dizziness, or    weakness.      SKIN: Denies rash, issues  with nails, non-healing sores, bleeding, blotching    skin or abnormal bruising. Denies new moles or changes to existing moles.      BREASTS: There is no swelling around breasts or nipple discharge.    EYES: Denies eye pain, blurred vision, swelling, redness or discharge.      ENT AND MOUTH: Denies runny nose, stuffiness, sinus trouble or sores. Denies    nosebleeds. Denies, hoarseness, change in voice or swelling in front of the    neck.      CARDIOVASCULAR: Denies chest pain, discomfort or palpitations. Denies neck    swelling or episodes of passing out.      RESPIRATORY: Denies cough, sputum production, blood in sputum, and denies    shortness of breath.      GI: Denies trouble swallowing, indigestion, heartburn, abdominal pain, nausea,    vomiting, diarrhea, altered bowel habits, blood in stool, discoloration of    stools, change in nature of stool, bloating, increased abdominal girth.      GENITOURINARY: No discharge. No pelvic pain or lumps. No rash around groin or  lesions. No urinary frequency, hesitation, painful urination or blood in    urine. Denies incontinence. No problems with intercourse.      MUSCULOSKELETAL: Denies neck or back pain. Denies weakness in arms or legs,    joint problems or distended inflamed veins in legs. Denies swelling or abnormal  glands.      NEUROLOGICAL: Denies tingling, numbness, altered mentation changes to nerve    function in the face, weakness to one or both of the body. Denies changes to    gait and denies multiple falls or accidents.      PHYSICAL EXAM:     Wt Readings from Last 3 Encounters:   02/02/18 62.9 kg (138 lb 10.7 oz)   12/22/17 62.1 kg (136 lb 14.5 oz)   12/18/17 63 kg (138 lb 14.2 oz)     Temp Readings from Last 3 Encounters:   02/02/18 97.8 °F (36.6 °C) (Oral)   12/22/17 97.9 °F (36.6 °C) (Oral)   12/18/17 98.7 °F (37.1 °C) (Oral)     BP Readings from Last 3 Encounters:   02/02/18 (!) 198/79   12/22/17 (!) 140/48   12/18/17 (!) 159/51     Pulse Readings from  Last 3 Encounters:   02/02/18 66   12/22/17 60   12/18/17 63     GENERAL: Comfortable looking patient. Patient is in no distress.  Awake, alert and oriented to time, person and place.  No anxiety, or agitation.      HEENT: Normal conjunctivae and eyelids. WNL.  PERRLA 3 to 4 mm. No icterus, no pallor, no congestion, and no discharge noted.     NECK:  Supple. Trachea is central.  No crepitus.  No JVD or masses.    RESPIRATORY:  No intercostal retractions.  No dullness to percussion.  Chest is clear to auscultation.  No rales, rhonchi or wheezes.  No crepitus.  Good air entry bilaterally.    CARDIOVASCULAR:  S1 and S2 are normally heard without murmurs or gallops.  All peripheral pulses are present.    ABDOMEN:  Normal abdomen.  No hepatosplenomegaly.  No free fluid.  Bowel sounds are present.  No hernia noted. No masses.  No rebound or tenderness.  No guarding or rigidity.  Umbilicus is midline.    LYMPHATICS:  No axillary, cervical, supraclavicular, submental, or inguinal lymphadenopathy.    SKIN/MUSCULOSKELETAL:  There is no evidence of excoriation marks or ecchmosis.  No rashes.  No cyanosis.  No clubbing.  No joint or skeletal deformities noted.  Normal range of motion.    NEUROLOGIC:  Higher functions are appropriate.  No cranial nerve deficits.  Normal ken.  Normal strength.  Motor and sensory functions are normal.  Deep tendon reflexes are normal.    GENITAL/RECTAL:  Exams are deferred.      Laboratory:     CBC:  Lab Results   Component Value Date    WBC 7.69 02/02/2018    RBC 3.29 (L) 02/02/2018    HGB 10.4 (L) 02/02/2018    HCT 32.1 (L) 02/02/2018    MCV 98 02/02/2018    MCH 31.6 (H) 02/02/2018    MCHC 32.4 02/02/2018    RDW 14.0 02/02/2018     02/02/2018    MPV 10.6 02/02/2018    GRAN 4.8 02/02/2018    GRAN 61.8 02/02/2018    LYMPH 1.9 02/02/2018    LYMPH 24.4 02/02/2018    MONO 0.7 02/02/2018    MONO 8.7 02/02/2018    EOS 0.4 02/02/2018    BASO 0.04 02/02/2018    EOSINOPHIL 4.6 02/02/2018     BASOPHIL 0.5 02/02/2018       BMP: BMP  Lab Results   Component Value Date     02/02/2018    K 5.5 (H) 02/02/2018     02/02/2018    CO2 27 02/02/2018    BUN 37 (H) 02/02/2018    CREATININE 1.40 02/02/2018    CALCIUM 9.9 02/02/2018    ANIONGAP 10 02/02/2018    ESTGFRAFRICA 37 (A) 02/02/2018    EGFRNONAA 32 (A) 02/02/2018     Hemolysis w/u negative   aspontaneous recovery of hgb  spe neg  b12 folate and iron all normal  LFT:   Lab Results   Component Value Date    ALT 26 02/02/2018    AST 25 02/02/2018    ALKPHOS 55 02/02/2018    BILITOT 0.3 02/02/2018         Assessment/Plan:     anemia secondary to ckd , has recovered, no need for growth factor, cont f/u with renal   rtc 3 months with cbc, cmp    cont dm and htn mx as before good control

## 2018-02-07 DIAGNOSIS — E79.0 HYPERURICEMIA: Primary | ICD-10-CM

## 2018-02-07 DIAGNOSIS — I25.83 CORONARY ARTERY DISEASE DUE TO LIPID RICH PLAQUE: ICD-10-CM

## 2018-02-07 DIAGNOSIS — I25.10 CORONARY ARTERY DISEASE DUE TO LIPID RICH PLAQUE: ICD-10-CM

## 2018-02-07 DIAGNOSIS — N18.4 CHRONIC KIDNEY DISEASE (CKD) STAGE G4/A1, SEVERELY DECREASED GLOMERULAR FILTRATION RATE (GFR) BETWEEN 15-29 ML/MIN/1.73 SQUARE METER AND ALBUMINURIA CREATININE RATIO LESS THAN 30 MG/G: ICD-10-CM

## 2018-02-07 DIAGNOSIS — E78.5 HYPERLIPIDEMIA, UNSPECIFIED HYPERLIPIDEMIA TYPE: ICD-10-CM

## 2018-02-09 ENCOUNTER — OUTPATIENT CASE MANAGEMENT (OUTPATIENT)
Dept: ADMINISTRATIVE | Facility: OTHER | Age: 83
End: 2018-02-09

## 2018-02-11 RX ORDER — TOLTERODINE 4 MG/1
CAPSULE, EXTENDED RELEASE ORAL
Qty: 90 CAPSULE | Refills: 3 | Status: SHIPPED | OUTPATIENT
Start: 2018-02-11 | End: 2018-02-14 | Stop reason: SDUPTHER

## 2018-02-11 RX ORDER — ALLOPURINOL 100 MG/1
100 TABLET ORAL DAILY
Qty: 90 TABLET | Refills: 0 | Status: SHIPPED | OUTPATIENT
Start: 2018-02-11 | End: 2018-02-14 | Stop reason: SDUPTHER

## 2018-02-11 NOTE — TELEPHONE ENCOUNTER
Done  Pls see lab orders - ok to add to upcoming labs but make sure they know its not fasting  Pls gigi appt with me in mid May

## 2018-02-12 ENCOUNTER — OUTPATIENT CASE MANAGEMENT (OUTPATIENT)
Dept: ADMINISTRATIVE | Facility: OTHER | Age: 83
End: 2018-02-12

## 2018-02-12 NOTE — PROGRESS NOTES
2/12/2018  --First Attempt to complete initial assessment for Outpatient Care Management;  left message requesting return call.   RPEM Mary

## 2018-02-14 ENCOUNTER — OUTPATIENT CASE MANAGEMENT (OUTPATIENT)
Dept: ADMINISTRATIVE | Facility: OTHER | Age: 83
End: 2018-02-14

## 2018-02-14 RX ORDER — ALLOPURINOL 100 MG/1
100 TABLET ORAL DAILY
Qty: 90 TABLET | Refills: 1 | Status: SHIPPED | OUTPATIENT
Start: 2018-02-14 | End: 2018-01-01 | Stop reason: SDUPTHER

## 2018-02-14 RX ORDER — LABETALOL 100 MG/1
100 TABLET, FILM COATED ORAL EVERY 12 HOURS
Qty: 60 TABLET | Refills: 1 | Status: SHIPPED | OUTPATIENT
Start: 2018-02-14 | End: 2018-04-07 | Stop reason: SDUPTHER

## 2018-02-14 RX ORDER — TOLTERODINE 4 MG/1
4 CAPSULE, EXTENDED RELEASE ORAL DAILY
Qty: 90 CAPSULE | Refills: 1 | Status: SHIPPED | OUTPATIENT
Start: 2018-02-14

## 2018-02-14 RX ORDER — CARBAMAZEPINE 100 MG/1
100 TABLET, EXTENDED RELEASE ORAL 2 TIMES DAILY
Qty: 180 TABLET | Refills: 1 | Status: SHIPPED | OUTPATIENT
Start: 2018-02-14 | End: 2018-02-26 | Stop reason: SDUPTHER

## 2018-02-14 NOTE — TELEPHONE ENCOUNTER
Notified pt's daughter of labs and follow up needed. Daughter stated verbal understanding. Follow up scheduled. Time and date confirmed. Daughter also requesting rx refills for pt. Rx pended. Please advise. Unable to pend Tegretol to send to [facility]. Canceled rx at previous sent to pharmacy. Please advise.

## 2018-02-14 NOTE — PROGRESS NOTES
2/14/2018  --First Attempt to complete initial assessment for Outpatient Care Management;  left message requesting return call on patient's phone.  Call to daughter Destiny Moran # [phone], and she declined enrollment.   PREM Mary

## 2018-02-26 RX ORDER — ALPRAZOLAM 0.25 MG/1
0.25 TABLET ORAL 2 TIMES DAILY PRN
COMMUNITY
End: 2018-03-22 | Stop reason: SDUPTHER

## 2018-02-26 RX ORDER — HYDROCODONE BITARTRATE AND ACETAMINOPHEN 5; 325 MG/1; MG/1
1 TABLET ORAL EVERY 4 HOURS PRN
Qty: 15 TABLET | Refills: 0 | Status: CANCELLED | OUTPATIENT
Start: 2018-02-26

## 2018-02-26 RX ORDER — ALPRAZOLAM 0.25 MG/1
0.25 TABLET ORAL 2 TIMES DAILY PRN
Qty: 60 TABLET | Refills: 0 | Status: CANCELLED | OUTPATIENT
Start: 2018-02-26

## 2018-02-27 RX ORDER — CARBAMAZEPINE 100 MG/1
100 TABLET, EXTENDED RELEASE ORAL 2 TIMES DAILY
Qty: 180 TABLET | Refills: 1 | Status: SHIPPED | OUTPATIENT
Start: 2018-02-27 | End: 2018-01-01 | Stop reason: SDUPTHER

## 2018-02-27 RX ORDER — DICLOFENAC SODIUM 10 MG/G
GEL TOPICAL DAILY
Qty: 100 G | Refills: 1 | Status: SHIPPED | OUTPATIENT
Start: 2018-02-27

## 2018-03-10 DIAGNOSIS — E78.5 HYPERLIPIDEMIA, UNSPECIFIED HYPERLIPIDEMIA TYPE: ICD-10-CM

## 2018-03-10 DIAGNOSIS — I25.10 CORONARY ARTERY DISEASE DUE TO LIPID RICH PLAQUE: ICD-10-CM

## 2018-03-10 DIAGNOSIS — I25.83 CORONARY ARTERY DISEASE DUE TO LIPID RICH PLAQUE: ICD-10-CM

## 2018-03-13 RX ORDER — SIMVASTATIN 20 MG/1
20 TABLET, FILM COATED ORAL NIGHTLY
Qty: 90 TABLET | Refills: 3 | Status: SHIPPED | OUTPATIENT
Start: 2018-03-13 | End: 2018-01-01

## 2018-03-13 RX ORDER — ISOSORBIDE MONONITRATE 60 MG/1
TABLET, EXTENDED RELEASE ORAL
Qty: 90 TABLET | Refills: 3 | Status: SHIPPED | OUTPATIENT
Start: 2018-03-13

## 2018-03-13 RX ORDER — CLOPIDOGREL BISULFATE 75 MG/1
TABLET ORAL
Qty: 90 TABLET | Refills: 3 | Status: SHIPPED | OUTPATIENT
Start: 2018-03-13 | End: 2018-01-01

## 2018-03-19 ENCOUNTER — TELEPHONE (OUTPATIENT)
Dept: FAMILY MEDICINE | Facility: CLINIC | Age: 83
End: 2018-03-19

## 2018-03-19 ENCOUNTER — OFFICE VISIT (OUTPATIENT)
Dept: FAMILY MEDICINE | Facility: CLINIC | Age: 83
End: 2018-03-19
Payer: MEDICARE

## 2018-03-19 VITALS
HEART RATE: 72 BPM | RESPIRATION RATE: 20 BRPM | HEIGHT: 58 IN | DIASTOLIC BLOOD PRESSURE: 82 MMHG | SYSTOLIC BLOOD PRESSURE: 162 MMHG | BODY MASS INDEX: 28.85 KG/M2 | TEMPERATURE: 98 F | WEIGHT: 137.44 LBS

## 2018-03-19 DIAGNOSIS — S01.01XD LACERATION OF SCALP WITHOUT FOREIGN BODY, SUBSEQUENT ENCOUNTER: ICD-10-CM

## 2018-03-19 DIAGNOSIS — I10 HYPERTENSION, UNSPECIFIED TYPE: ICD-10-CM

## 2018-03-19 DIAGNOSIS — Z48.02 VISIT FOR SUTURE REMOVAL: ICD-10-CM
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
## 2018-03-19 DIAGNOSIS — H61.20 IMPACTED CERUMEN, UNSPECIFIED LATERALITY: ICD-10-CM

## 2018-03-19 PROCEDURE — 99214 OFFICE O/P EST MOD 30 MIN: CPT | Mod: 25,S$GLB,, | Performed by: NURSE PRACTITIONER

## 2018-03-19 PROCEDURE — 99499 UNLISTED E&M SERVICE: CPT | Mod: S$GLB,,, | Performed by: NURSE PRACTITIONER

## 2018-03-19 PROCEDURE — 69210 REMOVE IMPACTED EAR WAX UNI: CPT | Mod: S$GLB,,, | Performed by: NURSE PRACTITIONER

## 2018-03-19 NOTE — PROCEDURES
"Ear Cerumen Removal  Date/Time: 3/19/2018 10:10 AM  Performed by: TATUM BAILEY  Authorized by: TATUM BAILEY     Time out: Immediately prior to procedure a "time out" was called to verify the correct patient, procedure, equipment, support staff and site/side marked as required.    Consent Done?:  Yes (Verbal)  Ceruminolytics applied: Ceruminolytics applied prior to the procedure    Medication Used:  Cerumenex  Location details:  Left ear  Procedure type: irrigation    Cerumen  Removal Results:  Cerumen partially removed  Patient tolerance:  Patient tolerated the procedure well with no immediate complications        "

## 2018-03-19 NOTE — PROGRESS NOTES
"Subjective:       Patient ID: Kristen Nicole is a 89 y.o. female.    Chief Complaint: Suture / Staple Removal    The patient is here with her daughter to have sutures removed to the back of her head.  On 3/10/18 she fell while washing dishes and hit her head on a.  She tells me she did not have a syncopal episode nor did she tripped.  She does not know what happened.  Her daughter tells me she has had one more episode like this since she had the sutures where she just fell down but was able to catch herself.  She denies any chest pain or palpitations.  The patient's daughter states that the only thing that happened to it the second time was a small skin tear to the left upper arm.  The patient tells me she is generally feeling well without any complaints.  The patient's daughter wants her ears checked his she feels like she might have "fluid" in the ears and not why she feels like she is falling.  She also tells me that her mother's ear often build up with wax that she would like that removed if possible today.    The pt's daughter tells me that she is interested in a refill on her Xanax and hydrocodone. She states that she requested this but has not heard back from us. She tells me she only gives her mother Xanax when she is extremely anxious and this does not happen often.  She would like her to have the hydrocodone "on hand" but when she has pain such as after a fall.    The patient's blood pressure is elevated here today.  The patient's daughter states that this is "white coat" syndrome and happens whenever she has come to the doctor.  The patient's daughter tells me she does monitor her blood pressure at home and it has been within normal limits.      Suture / Staple Removal       Review of Systems    Objective:       Vitals:    03/19/18 0933   BP: (!) 162/82   Pulse: 72   Resp: 20   Temp: 97.7 °F (36.5 °C)   TempSrc: Oral   Weight: 62.4 kg (137 lb 7.3 oz)   Height: 4' 10" (1.473 m)   PainSc: 0-No pain "         Physical Exam   Constitutional: She is oriented to person, place, and time. She appears well-developed and well-nourished.   Elderly female   HENT:   Head: Normocephalic and atraumatic.   Right Ear: External ear normal.   Left Ear: External ear normal.   Nose: Nose normal.   Mouth/Throat: Oropharynx is clear and moist.   Small amount of cerumen removed from the right ear canal.  There is a cerumen impaction of the left ear.  Unable to remove with curette.  This was partially removed with ear wash.   Eyes: Conjunctivae are normal. Right eye exhibits no discharge. Left eye exhibits no discharge. No scleral icterus.   Neck: Normal range of motion. Neck supple. No tracheal deviation present.   Cardiovascular: Normal rate, regular rhythm and normal heart sounds.  Exam reveals no friction rub.    No murmur heard.  Pulmonary/Chest: Effort normal and breath sounds normal. No stridor. No respiratory distress. She has no wheezes. She has no rales. She exhibits no tenderness.   Musculoskeletal: Normal range of motion.   Lymphadenopathy:     She has no cervical adenopathy.   Neurological: She is alert and oriented to person, place, and time.   Skin: Skin is warm and dry.   Approximately 4-5 sutures removed from the top of her head without difficulty.  Laceration edges well healed.   Psychiatric: She has a normal mood and affect.       Assessment:       1. Fall, subsequent encounter    2. Laceration of scalp without foreign body, subsequent encounter    3. Visit for suture removal    4. Hypertension, unspecified type    5. Impacted cerumen, unspecified laterality        Plan:       Kristen was seen today for suture / staple removal.    Diagnoses and all orders for this visit:    Fall, subsequent encounter    Laceration of scalp without foreign body, subsequent encounter/Visit for suture removal  Suches successfully removed without any difficulty.    Hypertension, unspecified type  Continue current medications.  I would  "have liked to adjust medications today but the patient daughter states her "blood pressure is always high when we come here" and declines adjustment of her medications.    Impacted cerumen, unspecified laterality  See procedure note         "

## 2018-03-19 NOTE — TELEPHONE ENCOUNTER
"Pt seen today in clinic, daughter request a refill on her Xanax in which she only gives her mother if she is extremely anxious.  She also request a refill on the hydrocodone to have "on hand" but when her mother is in pain.  "

## 2018-03-22 RX ORDER — ALPRAZOLAM 0.25 MG/1
0.25 TABLET ORAL DAILY PRN
Qty: 20 TABLET | Refills: 0 | Status: SHIPPED | OUTPATIENT
Start: 2018-03-22 | End: 2018-01-01 | Stop reason: SDUPTHER

## 2018-03-22 NOTE — TELEPHONE ENCOUNTER
"Pt daughter ( Destiny) aware. She was wanting just a few tablets of pain med. Explained to daughter that pt would need to be seen by Dr Jung before any pain meds could be given. Daughter said " nevermind to the pain meds " --lp   "

## 2018-03-22 NOTE — TELEPHONE ENCOUNTER
We could do a limited supply of xanax which I can send in now  We could consider a narcotic but would need an appt to discuss further

## 2018-04-07 DIAGNOSIS — N18.4 CHRONIC KIDNEY DISEASE (CKD) STAGE G4/A1, SEVERELY DECREASED GLOMERULAR FILTRATION RATE (GFR) BETWEEN 15-29 ML/MIN/1.73 SQUARE METER AND ALBUMINURIA CREATININE RATIO LESS THAN 30 MG/G: ICD-10-CM

## 2018-04-09 RX ORDER — LABETALOL 100 MG/1
TABLET, FILM COATED ORAL
Qty: 120 TABLET | Refills: 0 | Status: SHIPPED | OUTPATIENT
Start: 2018-04-09 | End: 2018-01-01 | Stop reason: SDUPTHER

## 2018-05-04 NOTE — PROGRESS NOTES
Subjective:       Patient ID: Kristen Nicole is a 89 y.o. female.    Chief Complaint: here for anemia after w/u done to discuss results with daughter has renal appt in   Social History     Social History    Marital status:      Spouse name: N/A    Number of children: 5    Years of education: N/A     Occupational History    nurse       Office     Social History Main Topics    Smoking status: Never Smoker    Smokeless tobacco: Never Used    Alcohol use No    Drug use: No    Sexual activity: Not Currently     Other Topics Concern    None     Social History Narrative    None     Family History   Problem Relation Age of Onset    Brain cancer Mother     Heart disease Mother     Pancreatic cancer Father     Cancer Brother     Pancreatic cancer Son     Heart disease Sister     Heart disease Sister     Heart disease Sister     Hypertension Sister     Diabetes Sister     Cancer Sister         Breast     Past Surgical History:   Procedure Laterality Date    ANTERIOR CERVICAL DISCECTOMY W/ FUSION      Cervical Fusion    APPENDECTOMY      CATARACT EXTRACTION Bilateral     ORIF TIBIA & FIBULA FRACTURES      screws and wires in ankle    RETINAL DETACHMENT SURGERY Right     TONSILLECTOMY, ADENOIDECTOMY       Past Medical History:   Diagnosis Date    Arthritis     Asthma     CAD (coronary artery disease)     s/p LAD stent;  last cath 6/16 with 30-40% blockages    Carotid arterial disease     noted on USG 12/15    CKD (chronic kidney disease) stage 3, GFR 30-59 ml/min     Followed by Dr. Harry Robles    Diabetes mellitus, type 2     Diastolic dysfunction     grade 2 on 4/17 ECHO    GERD (gastroesophageal reflux disease)     H/O cardiovascular stress test     normal 9/17    H/O colonoscopy     5/08    Hiatal hernia     noted on EGD    History of gout     History of SCC (squamous cell carcinoma) of skin     Hyperlipidemia     Hypertension     Ischemic cardiomyopathy     EF 55%  on 4/17 ECHO but was 25% on 12/16 ECHO    Mitral valve regurgitation     mild-moderate on 4/17 ECHO    Occipital neuralgia     Osteoporosis     noted on 6/17    Pulmonary hypertension     noted on 4/17 ECHO       Current Outpatient Prescriptions:     allopurinol (ZYLOPRIM) 100 MG tablet, Take 1 tablet (100 mg total) by mouth once daily., Disp: 90 tablet, Rfl: 1    ALPRAZolam (XANAX) 0.25 MG tablet, Take 1 tablet (0.25 mg total) by mouth daily as needed for Anxiety., Disp: 20 tablet, Rfl: 0    aspirin (ECOTRIN) 81 MG EC tablet, Take 81 mg by mouth once daily., Disp: , Rfl:     calcium-vitamin D3 (CALCIUM 500 + D) 500 mg(1,250mg) -200 unit per tablet, Take 1 tablet by mouth 2 (two) times daily with meals., Disp: , Rfl:     carBAMazepine (TEGRETOL XR) 100 MG 12 hr tablet, Take 1 tablet (100 mg total) by mouth 2 (two) times daily., Disp: 180 tablet, Rfl: 1    clopidogrel (PLAVIX) 75 mg tablet, TAKE 1 TABLET (75 MG TOTAL) BY MOUTH ONCE DAILY., Disp: 90 tablet, Rfl: 3    cranberry 400 mg Cap, Take 500 mg by mouth once daily. , Disp: , Rfl:     diclofenac sodium 1 % Gel, Apply topically once daily., Disp: 100 g, Rfl: 1    felodipine (PLENDIL) 5 MG 24 hr tablet, TAKE 1 TABLET EVERY EVENING (SUBSTITUTED FOR  PLENDIL), Disp: 90 tablet, Rfl: 3    ferrous sulfate (IRON) 325 mg (65 mg iron) Tab tablet, Take 325 mg by mouth every other day., Disp: , Rfl:     fluticasone (FLONASE) 50 mcg/actuation nasal spray, 2 sprays by Each Nare route once daily., Disp: 16 g, Rfl: 11    furosemide (LASIX) 10 mg/mL (alcohol free) solution, Take 1 mL (10 mg total) by mouth once daily., Disp: 120 mL, Rfl: 3    GINGER ROOT, BULK, MISC, 550 mg by Misc.(Non-Drug; Combo Route) route once daily., Disp: , Rfl:     hydrALAZINE (APRESOLINE) 25 MG tablet, Take 1 tablet (25 mg total) by mouth every 8 (eight) hours., Disp: 270 tablet, Rfl: 3    hydrocodone-acetaminophen 5-325mg (NORCO) 5-325 mg per tablet, Take 1 tablet by mouth every 4  (four) hours as needed., Disp: 15 tablet, Rfl: 0    ipratropium (ATROVENT HFA) 17 mcg/actuation inhaler, Inhale 2 puffs into the lungs every 6 (six) hours as needed for Wheezing. Rescue, Disp: , Rfl:     ipratropium (ATROVENT) 0.02 % nebulizer solution, Take 500 mcg by nebulization 4 (four) times daily as needed. Rescue , Disp: , Rfl:     isosorbide mononitrate (IMDUR) 60 MG 24 hr tablet, TAKE 1 TABLET EVERY EVENING, Disp: 90 tablet, Rfl: 3    labetalol (NORMODYNE) 100 MG tablet, TAKE 1 TABLET EVERY 12 HOURS, Disp: 120 tablet, Rfl: 0    magnesium oxide (MAG-OX) 400 mg tablet, Take 500 mg by mouth once daily. , Disp: , Rfl:     multivitamin (ONE DAILY MULTIVITAMIN) per tablet, Take 1 tablet by mouth once daily., Disp: , Rfl:     nebulizer and compressor (PORTABLE NEBULIZER SYSTEM) Jeana, Pt needs portable battery operated nebulizer., Disp: 1 each, Rfl: 0    nitroGLYCERIN (NITROSTAT) 0.4 MG SL tablet, Place 1 tablet (0.4 mg total) under the tongue every 5 (five) minutes as needed for Chest pain., Disp: 25 tablet, Rfl: 3    omega-3 acid ethyl esters (LOVAZA) 1 gram capsule, Take 1 g by mouth every other day. , Disp: , Rfl:     omeprazole (PRILOSEC) 20 MG capsule, Take 20 mg by mouth once daily., Disp: , Rfl:     simvastatin (ZOCOR) 20 MG tablet, TAKE 1 TABLET (20 MG TOTAL) BY MOUTH EVERY EVENING., Disp: 90 tablet, Rfl: 3    tolterodine (DETROL LA) 4 MG 24 hr capsule, Take 1 capsule (4 mg total) by mouth once daily., Disp: 90 capsule, Rfl: 1  Review of patient's allergies indicates:   Allergen Reactions    Albuterol Shortness Of Breath    Influenza a (h1n1) vac 09 (pf)      Other reaction(s): Angioedema    Nortriptyline      Causes headaches    Pneumococcal vaccine     Prevnar 13 [pneumoc 13-abram conj-dip cr(pf)] Edema         REVIEW OF SYSTEMS:     CONSTITUTIONAL: The patient denies any weight change. There is no apparent    change in appetite, fever, night sweats, headaches, fatigue, dizziness, or     weakness.      SKIN: Denies rash, issues with nails, non-healing sores, bleeding, blotching    skin or abnormal bruising. Denies new moles or changes to existing moles.      BREASTS: There is no swelling around breasts or nipple discharge.    EYES: Denies eye pain, blurred vision, swelling, redness or discharge.      ENT AND MOUTH: Denies runny nose, stuffiness, sinus trouble or sores. Denies    nosebleeds. Denies, hoarseness, change in voice or swelling in front of the    neck.      CARDIOVASCULAR: Denies chest pain, discomfort or palpitations. Denies neck    swelling or episodes of passing out.      RESPIRATORY: Denies cough, sputum production, blood in sputum, and denies    shortness of breath.      GI: Denies trouble swallowing, indigestion, heartburn, abdominal pain, nausea,    vomiting, diarrhea, altered bowel habits, blood in stool, discoloration of    stools, change in nature of stool, bloating, increased abdominal girth.      GENITOURINARY: No discharge. No pelvic pain or lumps. No rash around groin or  lesions. No urinary frequency, hesitation, painful urination or blood in    urine. Denies incontinence. No problems with intercourse.      MUSCULOSKELETAL: Denies neck or back pain. Denies weakness in arms or legs,    joint problems or distended inflamed veins in legs. Denies swelling or abnormal  glands.      NEUROLOGICAL: Denies tingling, numbness, altered mentation changes to nerve    function in the face, weakness to one or both of the body. Denies changes to    gait and denies multiple falls or accidents.      PHYSICAL EXAM:     Wt Readings from Last 3 Encounters:   05/04/18 62.4 kg (137 lb 9.1 oz)   03/19/18 62.4 kg (137 lb 7.3 oz)   03/10/18 63.2 kg (139 lb 5.3 oz)     Temp Readings from Last 3 Encounters:   05/04/18 98.4 °F (36.9 °C)   03/19/18 97.7 °F (36.5 °C) (Oral)   03/10/18 97.9 °F (36.6 °C) (Oral)     BP Readings from Last 3 Encounters:   05/04/18 (!) 151/67   03/19/18 (!) 162/82   02/02/18  (!) 198/79     Pulse Readings from Last 3 Encounters:   05/04/18 76   03/19/18 72   03/10/18 73     GENERAL: Comfortable looking patient. Patient is in no distress.  Awake, alert and oriented to time, person and place.  No anxiety, or agitation.      HEENT: Normal conjunctivae and eyelids. WNL.  PERRLA 3 to 4 mm. No icterus, no pallor, no congestion, and no discharge noted.     NECK:  Supple. Trachea is central.  No crepitus.  No JVD or masses.    RESPIRATORY:  No intercostal retractions.  No dullness to percussion.  Chest is clear to auscultation.  No rales, rhonchi or wheezes.  No crepitus.  Good air entry bilaterally.    CARDIOVASCULAR:  S1 and S2 are normally heard without murmurs or gallops.  All peripheral pulses are present.    ABDOMEN:  Normal abdomen.  No hepatosplenomegaly.  No free fluid.  Bowel sounds are present.  No hernia noted. No masses.  No rebound or tenderness.  No guarding or rigidity.  Umbilicus is midline.    LYMPHATICS:  No axillary, cervical, supraclavicular, submental, or inguinal lymphadenopathy.    SKIN/MUSCULOSKELETAL:  There is no evidence of excoriation marks or ecchmosis.  No rashes.  No cyanosis.  No clubbing.  No joint or skeletal deformities noted.  Normal range of motion.    NEUROLOGIC:  Higher functions are appropriate.  No cranial nerve deficits.  Normal ken.  Normal strength.  Motor and sensory functions are normal.  Deep tendon reflexes are normal.    GENITAL/RECTAL:  Exams are deferred.      Laboratory:     CBC:  Lab Results   Component Value Date    WBC 7.15 05/04/2018    RBC 3.51 (L) 05/04/2018    HGB 10.9 (L) 05/04/2018    HCT 33.8 (L) 05/04/2018    MCV 96 05/04/2018    MCH 31.1 (H) 05/04/2018    MCHC 32.2 05/04/2018    RDW 14.6 (H) 05/04/2018     05/04/2018    MPV 10.9 05/04/2018    GRAN 4.4 05/04/2018    GRAN 62.2 05/04/2018    LYMPH 1.9 05/04/2018    LYMPH 26.2 05/04/2018    MONO 0.6 05/04/2018    MONO 7.8 05/04/2018    EOS 0.2 05/04/2018    BASO 0.04 05/04/2018     EOSINOPHIL 3.2 05/04/2018    BASOPHIL 0.6 05/04/2018       BMP: BMP  Lab Results   Component Value Date     05/04/2018    K 5.4 (H) 05/04/2018     05/04/2018    CO2 26 05/04/2018    BUN 34 (H) 05/04/2018    CREATININE 1.49 (H) 05/04/2018    CALCIUM 9.6 05/04/2018    ANIONGAP 12 05/04/2018    ESTGFRAFRICA 34 (A) 05/04/2018    EGFRNONAA 30 (A) 05/04/2018     Hemolysis w/u negative   aspontaneous recovery of hgb  spe neg  b12 folate and iron all normal  LFT:   Lab Results   Component Value Date    ALT 28 05/04/2018    AST 22 05/04/2018    ALKPHOS 71 05/04/2018    BILITOT 0.4 05/04/2018         Assessment/Plan:     anemia secondary to ckd , has recovered, no need for growth factor, cont f/u with renal   rtc 4 months with cbc, cmp    cont dm and htn mx as before good control

## 2018-05-06 NOTE — TELEPHONE ENCOUNTER
Pls notify pt:    FLP looks great!  Continue current meds    Uric acid levels are borderline.  Are you taking the allopurinol regularly?  Any recent gout flare?

## 2018-05-08 NOTE — TELEPHONE ENCOUNTER
"Notified daughter of results. Daughter stated verbal understanding. Daughter also stated, "No gout flare ups. She's been taking it regularly. She slips sometimes because her memory is going, and I have to remind her. At her next visit, I wanted to talk to Dr. Jung about getting her a wheelchair."  "

## 2018-05-28 NOTE — PROGRESS NOTES
Subjective:       Patient ID: Kristen Nicole is a 89 y.o. female.    Chief Complaint: Hyperlipidemia (Follow up wilth labs prior)    HPI   The patient is a 89-year-old who is here today with her daughter for follow-up.  Today we discussed the followin)  diabetes.  In November, her A1c was 6.8.  They do not check her sugars consistently at home.  She has not felt as if her sugars have been high or low.  She is due for a foot exam  2)  hyperlipidemia.  She is currently taking Zocor and low positive.  Her recent LDL was 78  3)  hyperuricemia with history of gout.  Her recent uric acid level was 6.2  She's not had any recent gouty episode.  We discussed adjusting her allopurinol dose but for now she's happy with her current dose  4)  ASCVD.  She denies any anginal symptoms.  She does follow with her cardiologist regularly and is due back for follow-up there in July  5)  hypertension.  She is currently taking Plendil, Apresoline and labetalol.  She does not check her blood pressure outside of the office.  She is surprised her blood pressure is high today but has had a lot of stressors at home including an air conditioning unit which is not working and needs to be replaced    Since I've seen her last, she has not had any recent falls.  They did request a wheelchair to assist her getting around easier especially outside of the house    Review of Systems   Constitutional: Negative for appetite change, chills, diaphoresis, fatigue, fever and unexpected weight change.   HENT: Positive for hearing loss. Negative for congestion, ear pain, postnasal drip, rhinorrhea, sinus pressure, sneezing, sore throat and trouble swallowing.    Eyes: Negative for pain, discharge and visual disturbance.   Respiratory: Negative for cough, chest tightness, shortness of breath and wheezing.    Cardiovascular: Negative for chest pain, palpitations and leg swelling.   Gastrointestinal: Negative for abdominal distention, abdominal pain,  blood in stool, constipation, diarrhea, nausea and vomiting.   Musculoskeletal: Positive for arthralgias.   Skin: Negative for rash.       Objective:      Physical Exam   Constitutional: She is oriented to person, place, and time. She appears well-developed and well-nourished. No distress.   HENT:   Head: Normocephalic and atraumatic.   Right Ear: Tympanic membrane, external ear and ear canal normal. Decreased hearing is noted.   Left Ear: Tympanic membrane, external ear and ear canal normal. Decreased hearing is noted.   Nose: Nose normal.   Mouth/Throat: Oropharynx is clear and moist and mucous membranes are normal. No oral lesions. No oropharyngeal exudate, posterior oropharyngeal edema or posterior oropharyngeal erythema.   Eyes: Conjunctivae, EOM and lids are normal. Pupils are equal, round, and reactive to light. No scleral icterus.   Neck: Normal range of motion. Neck supple. Carotid bruit is not present. No thyroid mass and no thyromegaly present.   Cardiovascular: Normal rate and regular rhythm.   No extrasystoles are present. PMI is not displaced.  Exam reveals no gallop.    Murmur heard.   Systolic murmur is present with a grade of 3/6   Pulses:       Dorsalis pedis pulses are 2+ on the right side, and 2+ on the left side.        Posterior tibial pulses are 2+ on the right side, and 2+ on the left side.   Pulmonary/Chest: Effort normal and breath sounds normal. No accessory muscle usage. No respiratory distress.   Clear to auscultation bilaterally.   Abdominal: Soft. Normal appearance and bowel sounds are normal. She exhibits no abdominal bruit. There is no hepatosplenomegaly. There is no tenderness. There is no rebound.   Musculoskeletal:        Right foot: There is no deformity.        Left foot: There is no deformity.   Feet:   Right Foot:   Protective Sensation: 10 sites tested. 10 sites sensed.   Skin Integrity: Positive for warmth. Negative for ulcer or callus.   Left Foot:   Protective Sensation:  "10 sites tested. 10 sites sensed.   Skin Integrity: Positive for warmth. Negative for ulcer or callus.   Lymphadenopathy:        Head (right side): No submental and no submandibular adenopathy present.        Head (left side): No submental and no submandibular adenopathy present.        Right cervical: No superficial cervical, no deep cervical and no posterior cervical adenopathy present.       Left cervical: No superficial cervical, no deep cervical and no posterior cervical adenopathy present.        Right: No supraclavicular adenopathy present.        Left: No supraclavicular adenopathy present.   Neurological: She is alert and oriented to person, place, and time.   Skin: Skin is warm, dry and intact.   Psychiatric: She has a normal mood and affect.     Blood pressure (!) 168/80, pulse (!) 58, temperature 98.4 °F (36.9 °C), temperature source Oral, resp. rate 20, height 4' 10" (1.473 m), weight 62.5 kg (137 lb 12.8 oz), SpO2 97 %.Body mass index is 28.8 kg/m².        A/P:  1)  diabetes.  Status unknown.  We'll check an A1c soon.  Continue dietary interventions  2)  hyperlipidemia.  Well controlled.  Continue with Zocor and lovaza.  We will recheck fasting blood profile in 6 months  3)  hyperuricemia with history of gout.  Asymptomatic.  Continue the allopurinol.  We'll recheck uric acid level in 6 months   4)  ASCVD.  Asymptomatic.  Follow-up with cardiology in July  5)  hypertension.  Uncertain control.  For now she will continue with her current medication.  She will come back in 2 weeks to have the nurses recheck her blood pressure.  If her blood pressure remains elevated, we will make adjustments  6)  carotid artery stenosis.  Status unknown.  We will recheck a carotid ultrasound as this was last evaluated in December 2015  7)  aortic atherosclerosis.  Asymptomatic.  We'll work to modify all risk factors for further atherosclerotic progression  8)  pulmonary hypertension.  Asymptomatic.  Follow-up with " cardiology as planned  9)  arthritis.  I did provide a wheelchair prescription

## 2018-08-06 NOTE — TELEPHONE ENCOUNTER
Spoke with Daughter and changed appt to conveinent time ----- Message from Ruma Andres sent at 8/6/2018  8:36 AM CDT -----  Contact: Destiny  Type: Needs Medical Advice    Who Called:  daughter  Symptoms (please be specific):  na  How long has patient had these symptoms:  na  Ph[facility] name and phone #:  [facility]  Best Call Back Number: [phone]  Additional Information:  Patient needs to reschedule appointment but not lab as can not make midday appointments as has another obligation at noon time. Thanks!

## 2018-08-10 NOTE — TELEPHONE ENCOUNTER
Spoke with pt and advised of lab results and to follow up in 6 months. Pt verbalized understanding.

## 2018-08-13 NOTE — TELEPHONE ENCOUNTER
Pls notify pt:    Carotid US shows some mod calcifications which we will continue to monitor  Let's recheck an US in 6 - 9 months - orders in

## 2018-08-14 NOTE — PROGRESS NOTES
"Subjective:       Patient ID: Kristen Nicole is a 89 y.o. White female who presents for return patient evaluation for chronic renal failure.    She has no uremic or urinary symptoms and is in her usual state of health.  There have been no recent illnesses, hospitalizations or procedures.  She admits that she does not drink much fluid and that she really likes her sweets.      Review of Systems   Constitutional: Negative for appetite change, chills and fever.   Eyes: Negative for visual disturbance.   Respiratory: Positive for shortness of breath (intermittent). Negative for cough.    Cardiovascular: Negative for chest pain and leg swelling.   Gastrointestinal: Negative for abdominal pain, diarrhea, nausea and vomiting.   Genitourinary: Negative for difficulty urinating, dysuria and hematuria.   Musculoskeletal: Positive for arthralgias (hands) and back pain. Negative for myalgias.   Skin: Negative for rash.   Neurological: Negative for headaches.   Psychiatric/Behavioral: Negative for sleep disturbance. The patient is nervous/anxious.        The past medical, family and social histories were reviewed for this encounter.     /64   Pulse 62   Resp (!) 94   Ht 4' 10" (1.473 m)   Wt 62.9 kg (138 lb 10.7 oz)   LMP  (LMP Unknown)   BMI 28.98 kg/m²     Objective:      Physical Exam   Constitutional: She is oriented to person, place, and time. She appears well-developed and well-nourished. No distress.   HENT:   Head: Normocephalic and atraumatic.   Eyes: Conjunctivae are normal.   Neck: Neck supple. No JVD present.   Cardiovascular: Normal rate, regular rhythm and normal heart sounds. Exam reveals no gallop and no friction rub.   No murmur heard.  Pulmonary/Chest: Effort normal and breath sounds normal. No respiratory distress. She has no wheezes. She has no rales.   Abdominal: Soft. Bowel sounds are normal. She exhibits no distension. There is no tenderness.   Musculoskeletal: She exhibits no edema. "   Decreased pulses in hands and feet   Neurological: She is alert and oriented to person, place, and time.   Skin: Skin is warm and dry. No rash noted.   Psychiatric: She has a normal mood and affect.   Vitals reviewed.      Assessment:       1. Chronic kidney disease, stage III (moderate)        Plan:   Return to clinic in 6 months.  Labs for next visit include rp.  Baseline creatinine is 1.3 from 2436-5799.  Since that time her creatinine has ranged from 1.4-1.7.  She has a renal US which showed small kidneys bilaterally at 8.0 and 8.3 cm.  I reassured her and asked her to drink more fluids as dehydration can cause her creatinine to be labile like it has been the past two years.  I do not see any particularly nephrotoxic medications but   Continue current medications as prescribed and reviewed.

## 2018-08-14 NOTE — TELEPHONE ENCOUNTER
Called patient's number, no answer but left message.  Chart review shows if no answer to call daughter, Destiny.  Called Destiny and results were given to her.  Verbalized understanding.

## 2018-08-15 NOTE — TELEPHONE ENCOUNTER
----- Message from Sarah Albrecht sent at 8/15/2018  8:58 AM CDT -----  Pt is cancelling her appointment for Friday / Destiny called / has a twisted ankle / won't be able to walk

## 2018-08-15 NOTE — TELEPHONE ENCOUNTER
Spoke with Pt. Pt stated she twisted her ankle and will have to cancel her appt with Dr Rodriguez on Friday. Pt stated she would have her daughter call to r/s when she is able to walk again.

## 2018-09-12 NOTE — PROGRESS NOTES
Subjective:    Patient ID:  Kristen Nicole is a 89 y.o. female who presents for follow-up of CAD    HPI  She comes with shortness of breath with minimal activity, no chest pain, no swelling, no PND      Review of Systems   Constitution: Negative for decreased appetite, weakness, malaise/fatigue, weight gain and weight loss.   Cardiovascular: Positive for dyspnea on exertion. Negative for chest pain, leg swelling, palpitations and syncope.   Respiratory: Positive for shortness of breath. Negative for cough.    Gastrointestinal: Negative.    All other systems reviewed and are negative.       Objective:      Physical Exam   Constitutional: She is oriented to person, place, and time. She appears well-developed and well-nourished.   HENT:   Head: Normocephalic.   Eyes: Pupils are equal, round, and reactive to light.   Neck: Normal range of motion. Neck supple. No JVD present. Carotid bruit is not present. No thyromegaly present.   Cardiovascular: Normal rate, regular rhythm, normal heart sounds, intact distal pulses and normal pulses. PMI is not displaced. Exam reveals no gallop.   No murmur heard.  Pulmonary/Chest: Effort normal and breath sounds normal.   Abdominal: Soft. Normal appearance. She exhibits no mass. There is no hepatosplenomegaly. There is no tenderness.   Musculoskeletal: Normal range of motion. She exhibits no edema.   Neurological: She is alert and oriented to person, place, and time. She has normal strength and normal reflexes. No sensory deficit.   Skin: Skin is warm and intact.   Psychiatric: She has a normal mood and affect.   Nursing note and vitals reviewed.        Assessment:       1. Left bundle branch block    2. Atherosclerosis of native coronary artery of native heart without angina pectoris    3. Ischemic cardiomyopathy    4. Stented coronary artery    5. Mitral valve insufficiency, unspecified etiology         Plan:   Increase lasix to 20 qd  Continue all cardiac medications  Regular  exercise program  3 m f/u

## 2018-10-09 PROBLEM — J96.01 ACUTE RESPIRATORY FAILURE WITH HYPOXIA: Status: RESOLVED | Noted: 2017-10-01 | Resolved: 2018-01-01

## 2018-10-09 NOTE — PATIENT INSTRUCTIONS
Counseling and Referral of Other Preventative  (Italic type indicates deductible and co-insurance are waived)    Patient Name: Kristen Nicole  Today's Date: 10/9/2018    Health Maintenance       Date Due Completion Date    Zoster Vaccine 12/04/1983 ---    Eye Exam 06/20/2018 6/20/2017    Influenza Vaccine 08/01/2018 11/18/2014 (ClinicallyNA)    Override on 11/18/2014: Not Clinically Appropriate (patient allergic)    Override on 11/25/2013: Declined    Override on 9/20/2012: Declined    Urine Microalbumin 11/06/2018 11/6/2017    Hemoglobin A1c 02/10/2019 8/10/2018    Lipid Panel 05/04/2019 5/4/2018    Foot Exam 05/21/2019 5/21/2018    Override on 4/25/2016: Done (Dr. Camargo's note)    Override on 3/24/2016: Done    TETANUS VACCINE 03/10/2028 3/10/2018        No orders of the defined types were placed in this encounter.    The following information is provided to all patients.  This information is to help you find resources for any of the problems found today that may be affecting your health:                Living healthy guide: www.[facility].[place].gov      Understanding Diabetes: www.diabetes.org      Eating healthy: www.cdc.gov/healthyweight      CDC home safety checklist: www.cdc.gov/steadi/patient.html      Agency on Aging: www.goea.[place].[facility]      Alcoholics anonymous (AA): www.aa.org      Physical Activity: www.ophelia.nih.gov/jy9iqzg      Tobacco use: www.quitwithusla.org

## 2018-10-09 NOTE — PROGRESS NOTES
"Kristen Nicole presented for a  Medicare AWV and comprehensive Health Risk Assessment today. The following components were reviewed and updated:    · Medical history  · Family History  · Social history  · Allergies and Current Medications  · Health Risk Assessment  · Health Maintenance  · Care Team     ** See Completed Assessments for Annual Wellness Visit within the encounter summary.**   The following assessments were completed:  · Living Situation  · CAGE  · Depression Screening  · Timed Get Up and Go  · Whisper Test  · Cognitive Function Screening      · Nutrition Screening  · ADL Screening  · PAQ Screening    Vitals:    10/09/18 0751 10/09/18 0913   BP: (!) 160/65 (!) 144/66   BP Location: Left arm    Patient Position: Sitting    BP Method: Medium (Automatic)    Pulse: 68    SpO2: 98%    Weight: 63.1 kg (139 lb 1.8 oz)    Height: 4' 11" (1.499 m)      Body mass index is 28.1 kg/m².  Physical Exam   Constitutional: She is oriented to person, place, and time. She appears well-nourished.   Cardiovascular: Normal rate, regular rhythm, normal heart sounds and intact distal pulses.   Pulmonary/Chest: Effort normal and breath sounds normal.   Neurological: She is alert and oriented to person, place, and time.   Skin: Skin is warm and dry.   Vitals reviewed.        Diagnoses and health risks identified today and associated recommendations/orders:    1. Encounter for preventive health examination  Reviewed and discussed health maintenance.    Declined immunizations and eye exam update    2. Systolic dysfunction, LVEF 41%  Stable- continue current treatment and follow up routinely with PCP and cardiology (Dr. Gloria)  - WHEELCHAIR FOR HOME USE    3. Mitral valve insufficiency, unspecified etiology  Stable- continue current treatment and follow up routinely with PCP and cardiology (Dr. Gloria)  - WHEELCHAIR FOR HOME USE    4. Left bundle branch block  Stable- continue current treatment and follow up routinely with PCP " and cardiology (Dr. Gloria)  - WHEELCHAIR FOR HOME USE    5. Hyperlipidemia with target LDL less than 70  Stable- continue current treatment and follow up routinely with PCP and cardiology (Dr. Gloria)  - WHEELCHAIR FOR HOME USE    6. Chest pain, moderate coronary artery risk  Stable- continue current treatment and follow up routinely with PCP and cardiology (Dr. Gloria)  - WHEELCHAIR FOR HOME USE    7. Stented coronary artery  Stable- continue current treatment and follow up routinely with PCP and cardiology (Dr. Gloria)  - WHEELCHAIR FOR HOME USE    8. Ischemic cardiomyopathy  Stable- continue current treatment and follow up routinely with PCP and cardiology (Dr. Gloria)  - WHEELCHAIR FOR HOME USE    9. Atherosclerosis of native coronary artery of native heart without angina pectoris  Stable- continue current treatment and follow up routinely with PCP and cardiology (Dr. Gloria)  - WHEELCHAIR FOR HOME USE    10. Essential hypertension  Stable- continue current treatment and follow up routinely with PCP and cardiology (Dr. Gloria)  - WHEELCHAIR FOR HOME USE    11. Stenosis of right carotid artery  Stable- continue current treatment and follow up routinely with PCP and cardiology (Dr. Gloria)  - WHEELCHAIR FOR HOME USE    12. Controlled type 2 diabetes mellitus with diabetic nephropathy, without long-term current use of insulin  Stable- continue current treatment and follow up routinely with PCP   - WHEELCHAIR FOR HOME USE    13. Type 2 diabetes mellitus with stage 4 chronic kidney disease, without long-term current use of insulin  Stable- continue current treatment and follow up routinely with PCP   - WHEELCHAIR FOR HOME USE    14. Osteoarthritis, unspecified osteoarthritis type, unspecified site  - WHEELCHAIR FOR HOME USE    15. Risk for falls  - WHEELCHAIR FOR HOME USE    16. Shortness of breath  - WHEELCHAIR FOR HOME USE    17. Chronic kidney disease (CKD) stage G4/A1, severely decreased  glomerular filtration rate (GFR) between 15-29 mL/min/1.73 square meter and albuminuria creatinine ratio less than 30 mg/g  Stable- continue current treatment and follow up routinely with PCP     18. Iron deficiency  Stable- continue current treatment and follow up routinely with PCP     Provided Kristen with a 5-10 year written screening schedule and personal prevention plan. Recommendations were developed using the USPSTF age appropriate recommendations. Education, counseling, and referrals were provided as needed. After Visit Summary printed and given to patient which includes a list of additional screenings\tests needed.    Merry Zhong, NP

## 2018-11-15 NOTE — TELEPHONE ENCOUNTER
----- Message from Henrique Pearl sent at 11/15/2018  3:06 PM CST -----  Contact: Daughter, Destiny Dixon want to speak with a nurse regarding scheduling hospital follow up appointment this week please call back at [phone]

## 2018-11-16 NOTE — TELEPHONE ENCOUNTER
----- Message from Mirian Baltazar NP sent at 11/16/2018  4:38 PM CST -----  Please call family and let them know not to give her the promethazine. This is considered high risk medication for her age. Changed it to zofran for nausea. They can go get that at the pharmacy.

## 2018-11-16 NOTE — TELEPHONE ENCOUNTER
Called and spoke to pts daughter and informed her that the Norco 7.5-325mg one tab PO Q6 hrs PRN is ready to be picked up today before 5pm.  Also, informed pts daughter that pt cannot take xanax while taking the Norco, also pt can only take 1/2 tablet of phenergan while taking Norco and to follow up next week with Dr. Jung if she is not improving.  Pts daughter verbalizes understanding.

## 2018-11-16 NOTE — TELEPHONE ENCOUNTER
Notified patient's daughter of provider message, verbalized understanding to replace phenergan with zofran.

## 2018-11-16 NOTE — TELEPHONE ENCOUNTER
----- Message from Mirian Baltazar NP sent at 11/16/2018  3:13 PM CST -----  Please send refill of her [place] 7.5/325 to the pharmacy for her.  If there is something better for pain that you think would help please send that. She is in a lot of discomfort. Family said hospital will not keep her, last time they took her for the  Pain they sent her home. Thanks.

## 2018-11-16 NOTE — PROGRESS NOTES
Subjective:       Patient ID: Kristen Nicole is a 89 y.o. female.    Chief Complaint: Hospital Follow Up    HPI Patient is here for hospital follow up. She fell on 10/25 and sustained 4 fractured, mildly displaced ribs on the left side. Family states she is not able to sleep due to pain. States she gets about 2 hours of relief with the pain medication, then intense pain with moaning, rocking and crying for two hours before she can have another dose. States the medication is making her nauseated and itch. Family states she cannot lay down in the bed because it makes her feel like she is smothering. She has tried sitting in the recliner but that does not help much either. She went back to [facility] on 11/11 for chest pain and had clear cardiac workup. Family states they don't know what to do to make her feel better. She is not eating or drinking much due to the pain and nausea. They are giving her xanax for the anxiety but states that does not help much either.     The daughter got very upset when I explained that as an NP I cannot refill her pain medication. I explained that I would have to send a message to Dr. Jung and see if she would send in refill for her.     The following portion of the patients history was reviewed and updated as appropriate: allergies, current medications, past medical and surgical history. Past social history and problem list reviewed. Family PMH and Past social history reviewed. Tobacco, Illicit drug use reviewed.     Review of Systems   Constitutional: Positive for activity change and appetite change. Negative for fever.   Respiratory: Positive for shortness of breath (due to pain from fractured ribs.). Negative for cough.    Cardiovascular: Positive for leg swelling (increased edema to both feet.). Negative for chest pain and palpitations.   Gastrointestinal: Positive for nausea. Negative for abdominal pain, diarrhea and vomiting.   Musculoskeletal: Positive for gait problem (unable  "to ambulate due to pain. in wheelchair).        Pain over the left rib and back area.    Neurological: Positive for headaches.   Psychiatric/Behavioral: Positive for sleep disturbance (due to pain).       Objective:     BP (!) 154/62   Pulse 76   Temp 97.4 °F (36.3 °C) (Oral)   Resp 20   Ht 4' 10" (1.473 m)   LMP  (LMP Unknown)   BMI 28.84 kg/m²      Physical Exam     Constitutional: oriented to person, place, and time. well-developed and well-nourished. She appears to be in intense pain. She is moaning, rocking back and forth and crying due to pain.   Cardiovascular: Normal rate, regular rhythm and normal heart sounds.    Pulmonary/Chest: Effort normal and breath sounds normal. No respiratory distress. No wheezes.   Abdominal: Soft. Bowel sounds are normal. No distension. There is no tenderness.   Musculoskeletal: She is in wheelchair. She is tender over the left rib area and left paraspinal area. No bruising noted. .   Skin: Skin is warm and dry. No rashes or lesions  .   Assessment:       1. Hospital discharge follow-up    2. Closed fracture of multiple ribs of left side with delayed healing, subsequent encounter    3. Rib pain on left side    4. Nausea    5. Sleep disturbance        Plan:         Kristen was seen today for hospital follow up.    Diagnoses and all orders for this visit:    Hospital discharge follow-up    Closed fracture of multiple ribs of left side with delayed healing, subsequent encounter: she is in a lot of discomfort. Will give lidoderm patch to apply to the site of pain. Will give atarax low dose to take for the itching.     Rib pain on left side: will ask Dr. Jung to refill her pain medication. She only has 3 pills left and no follow ups scheduled until after the Thanksgiving holiday.    Nausea: will give her phenergan to help with the nausea. Family is aware that this can cause sedation. They need to monitor her breathing closely. Hopefully this will help her rest.     Sleep " disturbance: hopefully between the pain medication, phenergan and lidoderm patch she can get some relief and rest.     Other orders  -     lidocaine (LIDODERM) 5 %; Place 1 patch onto the skin once daily. Remove & Discard patch within 12 hours or as directed by MD  -     promethazine (PHENERGAN) 25 MG tablet; Take 1 tablet (25 mg total) by mouth every 6 (six) hours as needed for Nausea.  -     ALPRAZolam (XANAX) 0.25 MG tablet; Take 1 tablet (0.25 mg total) by mouth daily as needed for Anxiety.  Do not take with ETOH, Sedatives or other narcotics. Do not take and drive due to potential for sedation. Do not take more than the prescribed amount.  -     hydrOXYzine HCl (ATARAX) 25 MG tablet; Take 0.5 tablets (12.5 mg total) by mouth 4 (four) times daily as needed.    Continue current medication  Take medications only as prescribed  Healthy diet  Adequate rest  Adequate hydration  Avoid allergens  Avoid excessive caffeine

## 2018-11-16 NOTE — TELEPHONE ENCOUNTER
Please  call patient to inform her that daughter can come  the pain medication. She is advised that she is not to take the xanax with the pain medication. She is only to take 1/2 tablet of the promethazine. Follow up with Dr. Jung next week if not improving.

## 2018-11-24 NOTE — TELEPHONE ENCOUNTER
Spoke to nurse Montgomery and she states pt is not eating very much and is constantly feeling nauseated with some vomiting. States in the 40 minutes she was in the pts home the pt vomited nothing but stomach bile at least 4 times and was constantly dry heaving. The pt has no desire to eat and is taking all of her medications on and empty stomach. Nurse Ulises states the pt is taking the ondansetron but it is not helping and she thinks the pt would benefit from the Carafate. Please advise. Thanks. Nurse states you can call her at the number listed below.

## 2018-11-24 NOTE — TELEPHONE ENCOUNTER
Is she currently on hospice or palliative care?  If so, we can try carafate  If not, may be bowel obstruction and may need ER eval

## 2018-11-24 NOTE — TELEPHONE ENCOUNTER
----- Message from Jyoti Herzog sent at 11/23/2018 10:33 AM CST -----  Contact: stephen kolb/ [facility] [phone]  Please call stephen kolb/ [facility] [phone]  Requesting a call from nurse, want to see can they try carafate for nausea.

## 2018-12-05 NOTE — TELEPHONE ENCOUNTER
----- Message from RT Tsering sent at 12/5/2018  2:06 PM CST -----  Contact: PREM Garcia,[phone] [facility]  PREM Garcia,[phone] [facility], pt was discharged from [facility] and selected Dr. Jung as her PCP, thanks.

## 2019-01-01 RX ORDER — CARBAMAZEPINE 100 MG/1
TABLET, EXTENDED RELEASE ORAL
Qty: 180 TABLET | Refills: 1 | Status: SHIPPED | OUTPATIENT
Start: 2019-01-01

## 2019-11-06 NOTE — TELEPHONE ENCOUNTER
----- Message from Nichole Simpson sent at 5/1/2017 10:10 AM CDT -----  Contact: Daughter -Destiny- [phone]  Patient's daughter called asking for the status for new rx nitroglycerin with [facility] mail order pharmacy. Thanks!     no fever and no chills.
